# Patient Record
Sex: FEMALE | Race: WHITE | NOT HISPANIC OR LATINO | Employment: OTHER | URBAN - METROPOLITAN AREA
[De-identification: names, ages, dates, MRNs, and addresses within clinical notes are randomized per-mention and may not be internally consistent; named-entity substitution may affect disease eponyms.]

---

## 2022-04-21 ENCOUNTER — PRE-ADMISSION TESTING (OUTPATIENT)
Dept: PREADMISSION TESTING | Facility: HOSPITAL | Age: 72
End: 2022-04-21

## 2022-04-21 ENCOUNTER — HOSPITAL ENCOUNTER (OUTPATIENT)
Dept: GENERAL RADIOLOGY | Facility: HOSPITAL | Age: 72
Discharge: HOME | End: 2022-04-21

## 2022-04-21 VITALS
TEMPERATURE: 99.2 F | HEIGHT: 69 IN | OXYGEN SATURATION: 98 % | RESPIRATION RATE: 18 BRPM | WEIGHT: 262 LBS | HEART RATE: 68 BPM | BODY MASS INDEX: 38.8 KG/M2 | DIASTOLIC BLOOD PRESSURE: 71 MMHG | SYSTOLIC BLOOD PRESSURE: 131 MMHG

## 2022-04-21 LAB
ALBUMIN SERPL-MCNC: 4.5 G/DL (ref 3.5–5.2)
ALBUMIN/GLOB SERPL: 1.7 G/DL
ALP SERPL-CCNC: 223 U/L (ref 39–117)
ALT SERPL W P-5'-P-CCNC: 37 U/L (ref 1–33)
ANION GAP SERPL CALCULATED.3IONS-SCNC: 13 MMOL/L (ref 5–15)
AST SERPL-CCNC: 32 U/L (ref 1–32)
BILIRUB SERPL-MCNC: 0.7 MG/DL (ref 0–1.2)
BUN SERPL-MCNC: 16 MG/DL (ref 8–23)
BUN/CREAT SERPL: 13.3 (ref 7–25)
CALCIUM SPEC-SCNC: 9.7 MG/DL (ref 8.6–10.5)
CHLORIDE SERPL-SCNC: 106 MMOL/L (ref 98–107)
CO2 SERPL-SCNC: 25 MMOL/L (ref 22–29)
CREAT SERPL-MCNC: 1.2 MG/DL (ref 0.57–1)
DEPRECATED RDW RBC AUTO: 43.2 FL (ref 37–54)
EGFRCR SERPLBLD CKD-EPI 2021: 47.9 ML/MIN/1.73
ERYTHROCYTE [DISTWIDTH] IN BLOOD BY AUTOMATED COUNT: 13.8 % (ref 12.3–15.4)
GLOBULIN UR ELPH-MCNC: 2.6 GM/DL
GLUCOSE SERPL-MCNC: 119 MG/DL (ref 65–99)
HBA1C MFR BLD: 7.1 % (ref 4.8–5.6)
HCT VFR BLD AUTO: 34.1 % (ref 34–46.6)
HGB BLD-MCNC: 11.3 G/DL (ref 12–15.9)
INR PPP: 1 (ref 0.9–1.1)
MCH RBC QN AUTO: 28.5 PG (ref 26.6–33)
MCHC RBC AUTO-ENTMCNC: 33.1 G/DL (ref 31.5–35.7)
MCV RBC AUTO: 86.1 FL (ref 79–97)
PLATELET # BLD AUTO: 289 10*3/MM3 (ref 140–450)
PMV BLD AUTO: 10.9 FL (ref 6–12)
POTASSIUM SERPL-SCNC: 4.4 MMOL/L (ref 3.5–5.2)
PROT SERPL-MCNC: 7.1 G/DL (ref 6–8.5)
PROTHROMBIN TIME: 13.1 SECONDS (ref 11.7–14.2)
QT INTERVAL: 418 MS
RBC # BLD AUTO: 3.96 10*6/MM3 (ref 3.77–5.28)
SODIUM SERPL-SCNC: 144 MMOL/L (ref 136–145)
WBC NRBC COR # BLD: 8.91 10*3/MM3 (ref 3.4–10.8)

## 2022-04-21 PROCEDURE — 85027 COMPLETE CBC AUTOMATED: CPT

## 2022-04-21 PROCEDURE — 73030 X-RAY EXAM OF SHOULDER: CPT

## 2022-04-21 PROCEDURE — 93010 ELECTROCARDIOGRAM REPORT: CPT | Performed by: INTERNAL MEDICINE

## 2022-04-21 PROCEDURE — 36415 COLL VENOUS BLD VENIPUNCTURE: CPT

## 2022-04-21 PROCEDURE — 80053 COMPREHEN METABOLIC PANEL: CPT

## 2022-04-21 PROCEDURE — 85610 PROTHROMBIN TIME: CPT

## 2022-04-21 PROCEDURE — 83036 HEMOGLOBIN GLYCOSYLATED A1C: CPT

## 2022-04-21 PROCEDURE — 93005 ELECTROCARDIOGRAM TRACING: CPT

## 2022-04-21 RX ORDER — METFORMIN HYDROCHLORIDE 750 MG/1
750 TABLET, EXTENDED RELEASE ORAL 2 TIMES DAILY
COMMUNITY

## 2022-04-21 RX ORDER — METOPROLOL SUCCINATE 50 MG/1
50 TABLET, EXTENDED RELEASE ORAL DAILY
COMMUNITY

## 2022-04-21 RX ORDER — LANOLIN ALCOHOL/MO/W.PET/CERES
50 CREAM (GRAM) TOPICAL DAILY
COMMUNITY

## 2022-04-21 RX ORDER — MOMETASONE FUROATE 50 UG/1
1 SPRAY, METERED NASAL 2 TIMES DAILY
COMMUNITY

## 2022-04-21 RX ORDER — GUAIFENESIN 600 MG/1
1200 TABLET, EXTENDED RELEASE ORAL 2 TIMES DAILY PRN
COMMUNITY

## 2022-04-21 RX ORDER — ATORVASTATIN CALCIUM 20 MG/1
20 TABLET, FILM COATED ORAL DAILY
COMMUNITY

## 2022-04-21 RX ORDER — MENTHOL 40 MG/ML
1 GEL TOPICAL AS NEEDED
COMMUNITY

## 2022-04-21 RX ORDER — CALCIUM CARBONATE 200(500)MG
1 TABLET,CHEWABLE ORAL EVERY 4 HOURS PRN
COMMUNITY

## 2022-04-21 RX ORDER — CHOLECALCIFEROL (VITAMIN D3) 125 MCG
5 CAPSULE ORAL NIGHTLY
COMMUNITY

## 2022-04-21 RX ORDER — FUROSEMIDE 40 MG/1
40 TABLET ORAL DAILY
COMMUNITY

## 2022-04-21 RX ORDER — TRAMADOL HYDROCHLORIDE 50 MG/1
50 TABLET ORAL 2 TIMES DAILY PRN
COMMUNITY

## 2022-04-21 RX ORDER — MECLIZINE HYDROCHLORIDE 25 MG/1
25 TABLET ORAL 3 TIMES DAILY PRN
COMMUNITY

## 2022-04-21 RX ORDER — DIAPER,BRIEF,INFANT-TODD,DISP
1 EACH MISCELLANEOUS EVERY 6 HOURS PRN
COMMUNITY

## 2022-04-21 RX ORDER — PREGABALIN 150 MG/1
150 CAPSULE ORAL 2 TIMES DAILY
COMMUNITY

## 2022-04-21 RX ORDER — VENLAFAXINE HYDROCHLORIDE 75 MG/1
75 CAPSULE, EXTENDED RELEASE ORAL DAILY
COMMUNITY

## 2022-04-21 RX ORDER — ACETAMINOPHEN 325 MG/1
650 TABLET ORAL EVERY 6 HOURS PRN
COMMUNITY

## 2022-04-21 RX ORDER — LEVOTHYROXINE SODIUM 0.1 MG/1
100 TABLET ORAL DAILY
COMMUNITY

## 2022-04-21 RX ORDER — ERGOCALCIFEROL 1.25 MG/1
50000 CAPSULE ORAL WEEKLY
COMMUNITY

## 2022-04-21 RX ORDER — NAPROXEN SODIUM 220 MG
220 TABLET ORAL DAILY PRN
COMMUNITY

## 2022-04-21 RX ORDER — MONTELUKAST SODIUM 10 MG/1
10 TABLET ORAL DAILY
COMMUNITY

## 2022-04-21 RX ORDER — ASPIRIN 325 MG
325 TABLET ORAL DAILY
COMMUNITY

## 2022-04-21 NOTE — DISCHARGE INSTRUCTIONS
Take the following medications the morning of surgery:  VENLAFAXINE, NEXIUM, LEVOTHYROXINE, LYRICA, METOPROLOL    Arrive to hospital on your day of surgery at  7:00 AM.      If you are on prescription narcotic pain medication to control your pain you may also take that medication the morning of surgery.    General Instructions:  Do not eat solid food after midnight the night before surgery.  You may drink clear liquids day of surgery but must stop at least one hour before your hospital arrival time.  It is beneficial for you to have a clear drink that contains carbohydrates the day of surgery.  We suggest a 12 to 20 ounce bottle of Gatorade or Powerade for non-diabetic patients or a 12 to 20 ounce bottle of G2 or Powerade Zero for diabetic patients. (Pediatric patients, are not advised to drink a 12 to 20 ounce carbohydrate drink)    Clear liquids are liquids you can see through.  Nothing red in color.     Plain water                               Sports drinks  Sodas                                   Gelatin (Jell-O)  Fruit juices without pulp such as white grape juice and apple juice  Popsicles that contain no fruit or yogurt  Tea or coffee (no cream or milk added)  Gatorade / Powerade  G2 / Powerade Zero    Infants may have breast milk up to four hours before surgery.  Infants drinking formula may drink formula up to six hours before surgery.   Patients who avoid smoking, chewing tobacco and alcohol for 4 weeks prior to surgery have a reduced risk of post-operative complications.  Quit smoking as many days before surgery as you can.  Do not smoke, use chewing tobacco or drink alcohol the day of surgery.   If applicable bring your C-PAP/ BI-PAP machine.  Bring any papers given to you in the doctor’s office.  Wear clean comfortable clothes.  Do not wear contact lenses, false eyelashes or make-up.  Bring a case for your glasses.   Bring crutches or walker if applicable.  Remove all piercings.  Leave jewelry and any  other valuables at home.  Hair extensions with metal clips must be removed prior to surgery.  The Pre-Admission Testing nurse will instruct you to bring medications if unable to obtain an accurate list in Pre-Admission Testing.        If you were given a blood bank ID arm band remember to bring it with you the day of surgery.    Day of surgery:  Your arrival time is approximately two hours before your scheduled surgery time.  Upon arrival, a Pre-op nurse and Anesthesiologist will review your health history, obtain vital signs, and answer questions you may have.  The only belongings needed at this time will be a list of your home medications and if applicable your C-PAP/BI-PAP machine.  A Pre-op nurse will start an IV and you may receive medication in preparation for surgery, including something to help you relax.     Please be aware that surgery does come with discomfort.  We want to make every effort to control your discomfort so please discuss any uncontrolled symptoms with your nurse.   Your doctor will most likely have prescribed pain medications.      If you are going home after surgery you will receive individualized written care instructions before being discharged.  A responsible adult must drive you to and from the hospital on the day of your surgery and stay with you for 24 hours.  Discharge prescriptions can be filled by the hospital pharmacy during regular pharmacy hours.  If you are having surgery late in the day/evening your prescription may be e-prescribed to your pharmacy.  Please verify your pharmacy hours or chose a 24 hour pharmacy to avoid not having access to your prescription because your pharmacy has closed for the day.    If you are staying overnight following surgery, you will be transported to your hospital room following the recovery period.  [facility] has all private rooms.    If you have any questions please call Pre-Admission Testing at [phone].  Deductibles and  co-payments are collected on the day of service. Please be prepared to pay the required co-pay, deductible or deposit on the day of service as defined by your plan.    Patient Education for Self-Quarantine Process    Following your COVID testing, we strongly recommend that you wear a mask when you are with other people and practice social distancing.   Limit your activities to only required outings.  Wash your hands with soap and water frequently for at least 20 seconds.   Avoid touching your eyes, nose and mouth with unwashed hands.  Do not share anything - utensils, drinking glasses, food from the same bowl.   Sanitize household surfaces daily. Include all high touch areas (door handles, light switches, phones, countertops, etc.)    Call your surgeon immediately if you experience any of the following symptoms:  Sore Throat  Shortness of Breath or difficulty breathing  Cough  Chills  Body soreness or muscle pain  Headache  Fever  New loss of taste or smell  Do not arrive for your surgery ill.  Your procedure will need to be rescheduled to another time.  You will need to call your physician before the day of surgery to avoid any unnecessary exposure to hospital staff as well as other patients.        PREVENTING INFECTION IN SHOULDER SURGICAL SITES     C. acnes is a bacteria that lives deep within follicles and pores of the skin. It is found in large numbers on the skin of the face, axilla (armpit), chest and back and is the primary bacteria to cause a surgical site infection after shoulder surgery.      Use of a Benzoyl Peroxide solution prior to shoulder surgery decreases C. acnes and reduces post-op infections.   Your surgeon has ordered 5% Benzoyl Peroxide wash to be used three times prior to your surgery.     Please read the following instructions carefully and bring this form with you the day of surgery.     General bathing instructions starting two days before your surgery:    Shower using a fresh bar of  anti-bacterial soap (such as Dial) and clean washcloth.  Pay special attention to the neck, shoulder and armpit area.   Wash your hair as usual with your regular shampoo.   Rinse hair and body thoroughly with warm water (not hot water) to remove shampoo and residue.   Dry with a clean towel.              Sleep in a clean bed with clean clothing.  Do not allow pets to sleep with you.     For 2 days before surgery, avoid shaving with a razor because the razor can irritate skin and make it easier to develop an infection.    Any areas of open skin can increase the risk of a post-operative wound infection by allowing bacteria to enter and travel throughout the body.  Notify your surgeon if you have any skin wounds / rashes even if it is not near the expected surgical site.  The area will need assessed to determine if surgery should be delayed until it is healed.      First application of 5% Benzoyl Peroxide Wash two nights before surgery:                                                                Wash neck, shoulder (front, back, side) and armpit   with warm water, rinse and dry - see picture.  Gently wash the same areas with the Benzoyl Peroxide   cleanser going away from the neck for 10-20 seconds.   Work into a full lather and leave on the skin for   2 minutes for greatest effect.  Rinse thoroughly with warm water, not hot water.                     Pat dry with a clean towel.                                                            Wash your hands thoroughly.  Do not apply lotion, powder, perfume or deodorant.   Put on clean clothes.    Second application the night before surgery:  Repeat the above steps.        Third application morning of surgery:  Repeat the above steps.    Due to shoulder pain or decreased range of motion of your shoulder and arm, you may need assistance washing under the arm or the back portion of your shoulder.     Avoid further washing the areas of the skin treated with Benzoyl Peroxide  for at least 1 hour.    For your convenience, you may purchase Benzoyl Peroxide at [facility] retail pharmacy.     Warning:  Let your physician know if you are allergic to Benzoyl Peroxide or have very sensitive skin and cannot use it.   Stop using and contact your surgeon if you experience any excessive scaling, itching, swelling, skin irritation or other signs of a reaction.  Keep out of eyes, ears, nose and mouth.  Do not apply to sunburned, irritated or broken area on the skin.  Avoid unwanted problems with bleaching effect by following these tips:  Wash hands after each use.  Avoid contact with hair, clothing, furnishings or carpeting.  Wear clean, old t-shirt or clothing to bed.   Use clean, old white pillow cases and sheets to avoid discoloring your bed linens.                                                                                                                                                                                                                                                                                   Please complete the checklist below, bring it with you to the hospital                               the day of surgery and give to the Pre-op Nurse     Preoperative Skin Prep Checklist        Patient Name Label             Enter dates and ?  boxes to indicate completed    Surgery Date: _______________ Regular Shower  Benzoyl Peroxide Wash   First Application:  2 days before_______________  (Stop shaving all body parts)           Morning or Evening                        Evening    Second Application:  1 day  before________________        Morning or Evening                          Evening   Third Application:  Day of Surgery______________                           Morning                   Morning

## 2022-05-10 ENCOUNTER — LAB (OUTPATIENT)
Dept: LAB | Facility: HOSPITAL | Age: 72
End: 2022-05-10

## 2022-05-10 LAB — SARS-COV-2 ORF1AB RESP QL NAA+PROBE: NOT DETECTED

## 2022-05-10 PROCEDURE — U0004 COV-19 TEST NON-CDC HGH THRU: HCPCS

## 2022-05-10 PROCEDURE — C9803 HOPD COVID-19 SPEC COLLECT: HCPCS

## 2022-05-23 ENCOUNTER — PRE-ADMISSION TESTING (OUTPATIENT)
Dept: PREADMISSION TESTING | Facility: HOSPITAL | Age: 72
End: 2022-05-23

## 2022-05-23 VITALS
TEMPERATURE: 97.6 F | HEIGHT: 70 IN | BODY MASS INDEX: 37.22 KG/M2 | OXYGEN SATURATION: 97 % | DIASTOLIC BLOOD PRESSURE: 83 MMHG | RESPIRATION RATE: 16 BRPM | WEIGHT: 260 LBS | SYSTOLIC BLOOD PRESSURE: 137 MMHG | HEART RATE: 72 BPM

## 2022-05-23 LAB
ALBUMIN SERPL-MCNC: 4.4 G/DL (ref 3.5–5.2)
ALBUMIN/GLOB SERPL: 1.9 G/DL
ALP SERPL-CCNC: 214 U/L (ref 39–117)
ALT SERPL W P-5'-P-CCNC: 35 U/L (ref 1–33)
ANION GAP SERPL CALCULATED.3IONS-SCNC: 13.2 MMOL/L (ref 5–15)
AST SERPL-CCNC: 29 U/L (ref 1–32)
BILIRUB SERPL-MCNC: 0.8 MG/DL (ref 0–1.2)
BUN SERPL-MCNC: 20 MG/DL (ref 8–23)
BUN/CREAT SERPL: 18.2 (ref 7–25)
CALCIUM SPEC-SCNC: 9.5 MG/DL (ref 8.6–10.5)
CHLORIDE SERPL-SCNC: 102 MMOL/L (ref 98–107)
CO2 SERPL-SCNC: 22.8 MMOL/L (ref 22–29)
CREAT SERPL-MCNC: 1.1 MG/DL (ref 0.57–1)
DEPRECATED RDW RBC AUTO: 40.6 FL (ref 37–54)
EGFRCR SERPLBLD CKD-EPI 2021: 53.2 ML/MIN/1.73
ERYTHROCYTE [DISTWIDTH] IN BLOOD BY AUTOMATED COUNT: 12.7 % (ref 12.3–15.4)
GLOBULIN UR ELPH-MCNC: 2.3 GM/DL
GLUCOSE SERPL-MCNC: 123 MG/DL (ref 65–99)
HBA1C MFR BLD: 6.8 % (ref 4.8–5.6)
HCT VFR BLD AUTO: 34.6 % (ref 34–46.6)
HGB BLD-MCNC: 11 G/DL (ref 12–15.9)
INR PPP: 1.05 (ref 0.9–1.1)
MCH RBC QN AUTO: 27.8 PG (ref 26.6–33)
MCHC RBC AUTO-ENTMCNC: 31.8 G/DL (ref 31.5–35.7)
MCV RBC AUTO: 87.4 FL (ref 79–97)
PLATELET # BLD AUTO: 299 10*3/MM3 (ref 140–450)
PMV BLD AUTO: 10.7 FL (ref 6–12)
POTASSIUM SERPL-SCNC: 3.8 MMOL/L (ref 3.5–5.2)
PROT SERPL-MCNC: 6.7 G/DL (ref 6–8.5)
PROTHROMBIN TIME: 13.6 SECONDS (ref 11.7–14.2)
RBC # BLD AUTO: 3.96 10*6/MM3 (ref 3.77–5.28)
SARS-COV-2 ORF1AB RESP QL NAA+PROBE: NOT DETECTED
SODIUM SERPL-SCNC: 138 MMOL/L (ref 136–145)
WBC NRBC COR # BLD: 8.18 10*3/MM3 (ref 3.4–10.8)

## 2022-05-23 PROCEDURE — C9803 HOPD COVID-19 SPEC COLLECT: HCPCS

## 2022-05-23 PROCEDURE — 85610 PROTHROMBIN TIME: CPT

## 2022-05-23 PROCEDURE — 85027 COMPLETE CBC AUTOMATED: CPT

## 2022-05-23 PROCEDURE — U0004 COV-19 TEST NON-CDC HGH THRU: HCPCS

## 2022-05-23 PROCEDURE — 80053 COMPREHEN METABOLIC PANEL: CPT

## 2022-05-23 PROCEDURE — 83036 HEMOGLOBIN GLYCOSYLATED A1C: CPT

## 2022-05-23 PROCEDURE — 36415 COLL VENOUS BLD VENIPUNCTURE: CPT

## 2022-05-23 NOTE — DISCHARGE INSTRUCTIONS
Take the following medications the morning of surgery: ESOMEPRAZOLE, LEVOTHYROXINE, METOPROLOL, PREGABALIN,VENLAFAXINE    ARRIVE AT 10:30 AM.      If you are on prescription narcotic pain medication to control your pain you may also take that medication the morning of surgery.    General Instructions:  Do not eat solid food after midnight the night before surgery.  You may drink clear liquids day of surgery but must stop at least one hour before your hospital arrival time.  It is beneficial for you to have a clear drink that contains carbohydrates the day of surgery.  We suggest a 12 to 20 ounce bottle of Gatorade or Powerade for non-diabetic patients or a 12 to 20 ounce bottle of G2 or Powerade Zero for diabetic patients. (Pediatric patients, are not advised to drink a 12 to 20 ounce carbohydrate drink)    Clear liquids are liquids you can see through.  Nothing red in color.     Plain water                               Sports drinks  Sodas                                   Gelatin (Jell-O)  Fruit juices without pulp such as white grape juice and apple juice  Popsicles that contain no fruit or yogurt  Tea or coffee (no cream or milk added)  Gatorade / Powerade  G2 / Powerade Zero  Patients who avoid smoking, chewing tobacco and alcohol for 4 weeks prior to surgery have a reduced risk of post-operative complications.  Quit smoking as many days before surgery as you can.  Do not smoke, use chewing tobacco or drink alcohol the day of surgery.   If applicable bring your C-PAP/ BI-PAP machine.  Bring any papers given to you in the doctor’s office.  Wear clean comfortable clothes.  Do not wear contact lenses, false eyelashes or make-up.  Bring a case for your glasses.   Bring crutches or walker if applicable.  Remove all piercings.  Leave jewelry and any other valuables at home.  Hair extensions with metal clips must be removed prior to surgery.  The Pre-Admission Testing nurse will instruct you to bring medications if  unable to obtain an accurate list in Pre-Admission Testing.          Day of surgery:  Your arrival time is approximately two hours before your scheduled surgery time.  Upon arrival, a Pre-op nurse and Anesthesiologist will review your health history, obtain vital signs, and answer questions you may have.  The only belongings needed at this time will be a list of your home medications and if applicable your C-PAP/BI-PAP machine.  A Pre-op nurse will start an IV and you may receive medication in preparation for surgery, including something to help you relax.     Please be aware that surgery does come with discomfort.  We want to make every effort to control your discomfort so please discuss any uncontrolled symptoms with your nurse.   Your doctor will most likely have prescribed pain medications.      If you are going home after surgery you will receive individualized written care instructions before being discharged.  A responsible adult must drive you to and from the hospital on the day of your surgery and stay with you for 24 hours.  Discharge prescriptions can be filled by the hospital pharmacy during regular pharmacy hours.  If you are having surgery late in the day/evening your prescription may be e-prescribed to your pharmacy.  Please verify your pharmacy hours or chose a 24 hour pharmacy to avoid not having access to your prescription because your pharmacy has closed for the day.    If you are staying overnight following surgery, you will be transported to your hospital room following the recovery period.  [facility] has all private rooms.    If you have any questions please call Pre-Admission Testing at [phone].  Deductibles and co-payments are collected on the day of service. Please be prepared to pay the required co-pay, deductible or deposit on the day of service as defined by your plan.    Patient Education for Self-Quarantine Process    Following your COVID testing, we strongly  recommend that you wear a mask when you are with other people and practice social distancing.   Limit your activities to only required outings.  Wash your hands with soap and water frequently for at least 20 seconds.   Avoid touching your eyes, nose and mouth with unwashed hands.  Do not share anything - utensils, drinking glasses, food from the same bowl.   Sanitize household surfaces daily. Include all high touch areas (door handles, light switches, phones, countertops, etc.)    Call your surgeon immediately if you experience any of the following symptoms:  Sore Throat  Shortness of Breath or difficulty breathing  Cough  Chills  Body soreness or muscle pain  Headache  Fever  New loss of taste or smell  Do not arrive for your surgery ill.  Your procedure will need to be rescheduled to another time.  You will need to call your physician before the day of surgery to avoid any unnecessary exposure to hospital staff as well as other patients.        PREVENTING INFECTION IN SHOULDER SURGICAL SITES     C. acnes is a bacteria that lives deep within follicles and pores of the skin. It is found in large numbers on the skin of the face, axilla (armpit), chest and back and is the primary bacteria to cause a surgical site infection after shoulder surgery.      Use of a Benzoyl Peroxide solution prior to shoulder surgery decreases C. acnes and reduces post-op infections.   Your surgeon has ordered 5% Benzoyl Peroxide wash to be used three times prior to your surgery.     Please read the following instructions carefully and bring this form with you the day of surgery.     General bathing instructions starting two days before your surgery:    Shower using a fresh bar of anti-bacterial soap (such as Dial) and clean washcloth.  Pay special attention to the neck, shoulder and armpit area.   Wash your hair as usual with your regular shampoo.   Rinse hair and body thoroughly with warm water (not hot water) to remove shampoo and  residue.   Dry with a clean towel.              Sleep in a clean bed with clean clothing.  Do not allow pets to sleep with you.     For 2 days before surgery, avoid shaving with a razor because the razor can irritate skin and make it easier to develop an infection.    Any areas of open skin can increase the risk of a post-operative wound infection by allowing bacteria to enter and travel throughout the body.  Notify your surgeon if you have any skin wounds / rashes even if it is not near the expected surgical site.  The area will need assessed to determine if surgery should be delayed until it is healed.      First application of 5% Benzoyl Peroxide Wash two nights before surgery:                                                                Wash neck, shoulder (front, back, side) and armpit   with warm water, rinse and dry - see picture.  Gently wash the same areas with the Benzoyl Peroxide   cleanser going away from the neck for 10-20 seconds.   Work into a full lather and leave on the skin for   2 minutes for greatest effect.  Rinse thoroughly with warm water, not hot water.                     Pat dry with a clean towel.                                                            Wash your hands thoroughly.  Do not apply lotion, powder, perfume or deodorant.   Put on clean clothes.    Second application the night before surgery:  Repeat the above steps.        Third application morning of surgery:  Repeat the above steps.    Due to shoulder pain or decreased range of motion of your shoulder and arm, you may need assistance washing under the arm or the back portion of your shoulder.     Avoid further washing the areas of the skin treated with Benzoyl Peroxide for at least 1 hour.    For your convenience, you may purchase Benzoyl Peroxide at [facility] retail pharmacy.     Warning:  Let your physician know if you are allergic to Benzoyl Peroxide or have very sensitive skin and cannot use it.   Stop using and  contact your surgeon if you experience any excessive scaling, itching, swelling, skin irritation or other signs of a reaction.  Keep out of eyes, ears, nose and mouth.  Do not apply to sunburned, irritated or broken area on the skin.  Avoid unwanted problems with bleaching effect by following these tips:  Wash hands after each use.  Avoid contact with hair, clothing, furnishings or carpeting.  Wear clean, old t-shirt or clothing to bed.   Use clean, old white pillow cases and sheets to avoid discoloring your bed linens.                                                                                                                                                                                                                                                                                   Please complete the checklist below, bring it with you to the hospital                               the day of surgery and give to the Pre-op Nurse     Preoperative Skin Prep Checklist        Patient Name Label             Enter dates and ?  boxes to indicate completed    Surgery Date: _______________ Regular Shower  Benzoyl Peroxide Wash   First Application:  2 days before_______________  (Stop shaving all body parts)           Morning or Evening                        Evening    Second Application:  1 day  before________________        Morning or Evening                          Evening   Third Application:  Day of Surgery______________                           Morning                   Morning

## 2022-05-25 ENCOUNTER — ANESTHESIA EVENT (OUTPATIENT)
Dept: PERIOP | Facility: HOSPITAL | Age: 72
End: 2022-05-25

## 2022-05-25 ENCOUNTER — HOSPITAL ENCOUNTER (OUTPATIENT)
Facility: HOSPITAL | Age: 72
Discharge: NURSING, REHAB OR LONG-TERM CARE | End: 2022-05-26
Attending: ORTHOPAEDIC SURGERY | Admitting: ORTHOPAEDIC SURGERY

## 2022-05-25 ENCOUNTER — APPOINTMENT (OUTPATIENT)
Dept: GENERAL RADIOLOGY | Facility: HOSPITAL | Age: 72
End: 2022-05-25

## 2022-05-25 ENCOUNTER — ANESTHESIA (OUTPATIENT)
Dept: PERIOP | Facility: HOSPITAL | Age: 72
End: 2022-05-25

## 2022-05-25 DIAGNOSIS — Z96.611 H/O TOTAL SHOULDER REPLACEMENT, RIGHT: Primary | ICD-10-CM

## 2022-05-25 PROBLEM — J44.9 COPD (CHRONIC OBSTRUCTIVE PULMONARY DISEASE): Status: ACTIVE | Noted: 2022-05-25

## 2022-05-25 PROBLEM — I10 HTN (HYPERTENSION): Status: ACTIVE | Noted: 2022-05-25

## 2022-05-25 PROBLEM — E11.9 TYPE 2 DIABETES MELLITUS, WITHOUT LONG-TERM CURRENT USE OF INSULIN: Status: ACTIVE | Noted: 2022-05-25

## 2022-05-25 PROBLEM — K21.9 GERD WITHOUT ESOPHAGITIS: Status: ACTIVE | Noted: 2022-05-25

## 2022-05-25 PROBLEM — E03.9 HYPOTHYROIDISM: Status: ACTIVE | Noted: 2022-05-25

## 2022-05-25 LAB
GLUCOSE BLDC GLUCOMTR-MCNC: 131 MG/DL (ref 70–130)
GLUCOSE BLDC GLUCOMTR-MCNC: 153 MG/DL (ref 70–130)
GLUCOSE BLDC GLUCOMTR-MCNC: 212 MG/DL (ref 70–130)
HCT VFR BLD AUTO: 31.1 % (ref 34–46.6)
HGB BLD-MCNC: 10.2 G/DL (ref 12–15.9)

## 2022-05-25 PROCEDURE — G0378 HOSPITAL OBSERVATION PER HR: HCPCS

## 2022-05-25 PROCEDURE — 25010000002 KETOROLAC TROMETHAMINE PER 15 MG: Performed by: ORTHOPAEDIC SURGERY

## 2022-05-25 PROCEDURE — A9270 NON-COVERED ITEM OR SERVICE: HCPCS | Performed by: ORTHOPAEDIC SURGERY

## 2022-05-25 PROCEDURE — 25010000002 ROPIVACAINE PER 1 MG: Performed by: ORTHOPAEDIC SURGERY

## 2022-05-25 PROCEDURE — C1776 JOINT DEVICE (IMPLANTABLE): HCPCS | Performed by: ORTHOPAEDIC SURGERY

## 2022-05-25 PROCEDURE — L3670 SO ACRO/CLAV CAN WEB PRE OTS: HCPCS | Performed by: ORTHOPAEDIC SURGERY

## 2022-05-25 PROCEDURE — 25010000002 ONDANSETRON PER 1 MG: Performed by: NURSE ANESTHETIST, CERTIFIED REGISTERED

## 2022-05-25 PROCEDURE — 25010000002 DEXAMETHASONE PER 1 MG: Performed by: NURSE ANESTHETIST, CERTIFIED REGISTERED

## 2022-05-25 PROCEDURE — 0 BUPIVACAINE LIPOSOME 1.3 % SUSPENSION: Performed by: ANESTHESIOLOGY

## 2022-05-25 PROCEDURE — 25010000002 PHENYLEPHRINE 10 MG/ML SOLUTION 5 ML VIAL: Performed by: NURSE ANESTHETIST, CERTIFIED REGISTERED

## 2022-05-25 PROCEDURE — 25010000002 FENTANYL CITRATE (PF) 50 MCG/ML SOLUTION: Performed by: ANESTHESIOLOGY

## 2022-05-25 PROCEDURE — 76942 ECHO GUIDE FOR BIOPSY: CPT | Performed by: ORTHOPAEDIC SURGERY

## 2022-05-25 PROCEDURE — 25010000002 CEFAZOLIN PER 500 MG: Performed by: ORTHOPAEDIC SURGERY

## 2022-05-25 PROCEDURE — 25010000002 PHENYLEPHRINE 10 MG/ML SOLUTION: Performed by: NURSE ANESTHETIST, CERTIFIED REGISTERED

## 2022-05-25 PROCEDURE — 63710000001 VENLAFAXINE XR 75 MG CAPSULE SUSTAINED-RELEASE 24 HR: Performed by: ORTHOPAEDIC SURGERY

## 2022-05-25 PROCEDURE — 85014 HEMATOCRIT: CPT | Performed by: ORTHOPAEDIC SURGERY

## 2022-05-25 PROCEDURE — 25010000002 CLONIDINE PER 1 MG: Performed by: ORTHOPAEDIC SURGERY

## 2022-05-25 PROCEDURE — C9290 INJ, BUPIVACAINE LIPOSOME: HCPCS | Performed by: ANESTHESIOLOGY

## 2022-05-25 PROCEDURE — 25010000002 NEOSTIGMINE 5 MG/10ML SOLUTION: Performed by: NURSE ANESTHETIST, CERTIFIED REGISTERED

## 2022-05-25 PROCEDURE — 73030 X-RAY EXAM OF SHOULDER: CPT

## 2022-05-25 PROCEDURE — 25010000002 EPINEPHRINE 1 MG/ML SOLUTION 30 ML VIAL: Performed by: ORTHOPAEDIC SURGERY

## 2022-05-25 PROCEDURE — 63710000001 ATORVASTATIN 20 MG TABLET: Performed by: ORTHOPAEDIC SURGERY

## 2022-05-25 PROCEDURE — 85018 HEMOGLOBIN: CPT | Performed by: ORTHOPAEDIC SURGERY

## 2022-05-25 PROCEDURE — 63710000001 PREGABALIN 75 MG CAPSULE: Performed by: ORTHOPAEDIC SURGERY

## 2022-05-25 PROCEDURE — 25010000002 PROPOFOL 10 MG/ML EMULSION: Performed by: NURSE ANESTHETIST, CERTIFIED REGISTERED

## 2022-05-25 PROCEDURE — 25010000002 MIDAZOLAM PER 1 MG: Performed by: ANESTHESIOLOGY

## 2022-05-25 PROCEDURE — 82962 GLUCOSE BLOOD TEST: CPT

## 2022-05-25 DEVICE — IMPLANTABLE DEVICE: Type: IMPLANTABLE DEVICE | Site: SHOULDER | Status: FUNCTIONAL

## 2022-05-25 DEVICE — SHORT HUMERAL DIAPHYSIS - CEMENTLESS - 10
Type: IMPLANTABLE DEVICE | Site: SHOULDER | Status: FUNCTIONAL
Brand: SHOULDER SYSTEM

## 2022-05-25 DEVICE — DEV CONTRL TISS STRATAFIX SPIRAL MNCRYL UD 3/0 PLS 30CM: Type: IMPLANTABLE DEVICE | Site: SHOULDER | Status: FUNCTIONAL

## 2022-05-25 DEVICE — GLENOIDSPHERE 36XØ24.5
Type: IMPLANTABLE DEVICE | Site: SHOULDER | Status: FUNCTIONAL
Brand: SHOULDER SYSTEM

## 2022-05-25 DEVICE — FW,BPB #2 SUTR,BLU W/NDL
Type: IMPLANTABLE DEVICE | Site: SHOULDER | Status: FUNCTIONAL
Brand: ARTHREX®

## 2022-05-25 DEVICE — GLENOID POLYAXIAL LOCKING SCREW - L14
Type: IMPLANTABLE DEVICE | Site: SHOULDER | Status: FUNCTIONAL
Brand: SHOULDER SYSTEM

## 2022-05-25 DEVICE — HUMERAL REVERSE HC LINER Ø36/+6MM
Type: IMPLANTABLE DEVICE | Site: SHOULDER | Status: FUNCTIONAL
Brand: SHOULDER SYSTEM

## 2022-05-25 DEVICE — HUMERAL REVERSE METAPHYSIS +0MM/0°
Type: IMPLANTABLE DEVICE | Site: SHOULDER | Status: FUNCTIONAL
Brand: SHOULDER SYSTEM

## 2022-05-25 DEVICE — GLENOID POLYAXIAL LOCKING SCREW - L22
Type: IMPLANTABLE DEVICE | Site: SHOULDER | Status: FUNCTIONAL
Brand: SHOULDER SYSTEM

## 2022-05-25 DEVICE — THREADED GLENOID BASEPLATE Ø24.5X25
Type: IMPLANTABLE DEVICE | Site: SHOULDER | Status: FUNCTIONAL
Brand: SHOULDER SYSTEM

## 2022-05-25 DEVICE — SHOULDER DISPOSABLE PIN PACK
Type: IMPLANTABLE DEVICE | Site: SHOULDER | Status: FUNCTIONAL
Brand: SHOULDER INSTRUMENTS

## 2022-05-25 RX ORDER — DEXTROSE MONOHYDRATE 25 G/50ML
25 INJECTION, SOLUTION INTRAVENOUS
Status: DISCONTINUED | OUTPATIENT
Start: 2022-05-25 | End: 2022-05-26 | Stop reason: HOSPADM

## 2022-05-25 RX ORDER — TRAMADOL HYDROCHLORIDE 50 MG/1
50 TABLET ORAL 2 TIMES DAILY PRN
Status: DISCONTINUED | OUTPATIENT
Start: 2022-05-25 | End: 2022-05-26 | Stop reason: HOSPADM

## 2022-05-25 RX ORDER — ONDANSETRON 2 MG/ML
4 INJECTION INTRAMUSCULAR; INTRAVENOUS ONCE AS NEEDED
Status: DISCONTINUED | OUTPATIENT
Start: 2022-05-25 | End: 2022-05-25 | Stop reason: HOSPADM

## 2022-05-25 RX ORDER — ONDANSETRON 2 MG/ML
4 INJECTION INTRAMUSCULAR; INTRAVENOUS EVERY 6 HOURS PRN
Status: DISCONTINUED | OUTPATIENT
Start: 2022-05-25 | End: 2022-05-26 | Stop reason: HOSPADM

## 2022-05-25 RX ORDER — INSULIN LISPRO 100 [IU]/ML
0-9 INJECTION, SOLUTION INTRAVENOUS; SUBCUTANEOUS
Status: DISCONTINUED | OUTPATIENT
Start: 2022-05-26 | End: 2022-05-26 | Stop reason: HOSPADM

## 2022-05-25 RX ORDER — SODIUM CHLORIDE, SODIUM LACTATE, POTASSIUM CHLORIDE, CALCIUM CHLORIDE 600; 310; 30; 20 MG/100ML; MG/100ML; MG/100ML; MG/100ML
100 INJECTION, SOLUTION INTRAVENOUS CONTINUOUS
Status: DISCONTINUED | OUTPATIENT
Start: 2022-05-25 | End: 2022-05-26 | Stop reason: HOSPADM

## 2022-05-25 RX ORDER — NEOSTIGMINE METHYLSULFATE 0.5 MG/ML
INJECTION, SOLUTION INTRAVENOUS AS NEEDED
Status: DISCONTINUED | OUTPATIENT
Start: 2022-05-25 | End: 2022-05-25 | Stop reason: SURG

## 2022-05-25 RX ORDER — FLUMAZENIL 0.1 MG/ML
0.2 INJECTION INTRAVENOUS AS NEEDED
Status: DISCONTINUED | OUTPATIENT
Start: 2022-05-25 | End: 2022-05-25 | Stop reason: HOSPADM

## 2022-05-25 RX ORDER — PROPOFOL 10 MG/ML
VIAL (ML) INTRAVENOUS AS NEEDED
Status: DISCONTINUED | OUTPATIENT
Start: 2022-05-25 | End: 2022-05-25 | Stop reason: SURG

## 2022-05-25 RX ORDER — METOPROLOL SUCCINATE 50 MG/1
50 TABLET, EXTENDED RELEASE ORAL DAILY
Status: DISCONTINUED | OUTPATIENT
Start: 2022-05-26 | End: 2022-05-26 | Stop reason: HOSPADM

## 2022-05-25 RX ORDER — HYDROCODONE BITARTRATE AND ACETAMINOPHEN 7.5; 325 MG/1; MG/1
2 TABLET ORAL EVERY 4 HOURS PRN
Status: DISCONTINUED | OUTPATIENT
Start: 2022-05-25 | End: 2022-05-26 | Stop reason: HOSPADM

## 2022-05-25 RX ORDER — HYDROMORPHONE HYDROCHLORIDE 1 MG/ML
0.5 INJECTION, SOLUTION INTRAMUSCULAR; INTRAVENOUS; SUBCUTANEOUS
Status: DISCONTINUED | OUTPATIENT
Start: 2022-05-25 | End: 2022-05-26 | Stop reason: HOSPADM

## 2022-05-25 RX ORDER — ONDANSETRON 2 MG/ML
INJECTION INTRAMUSCULAR; INTRAVENOUS AS NEEDED
Status: DISCONTINUED | OUTPATIENT
Start: 2022-05-25 | End: 2022-05-25 | Stop reason: SURG

## 2022-05-25 RX ORDER — VENLAFAXINE HYDROCHLORIDE 75 MG/1
75 CAPSULE, EXTENDED RELEASE ORAL DAILY
Status: DISCONTINUED | OUTPATIENT
Start: 2022-05-25 | End: 2022-05-26 | Stop reason: HOSPADM

## 2022-05-25 RX ORDER — ACETAMINOPHEN 325 MG/1
325 TABLET ORAL EVERY 4 HOURS PRN
Status: DISCONTINUED | OUTPATIENT
Start: 2022-05-25 | End: 2022-05-26 | Stop reason: HOSPADM

## 2022-05-25 RX ORDER — ACETAMINOPHEN 325 MG/1
650 TABLET ORAL EVERY 6 HOURS PRN
Status: DISCONTINUED | OUTPATIENT
Start: 2022-05-25 | End: 2022-05-26 | Stop reason: HOSPADM

## 2022-05-25 RX ORDER — LABETALOL HYDROCHLORIDE 5 MG/ML
5 INJECTION, SOLUTION INTRAVENOUS
Status: DISCONTINUED | OUTPATIENT
Start: 2022-05-25 | End: 2022-05-25 | Stop reason: HOSPADM

## 2022-05-25 RX ORDER — FAMOTIDINE 10 MG/ML
20 INJECTION, SOLUTION INTRAVENOUS ONCE
Status: COMPLETED | OUTPATIENT
Start: 2022-05-25 | End: 2022-05-25

## 2022-05-25 RX ORDER — CEFAZOLIN SODIUM IN 0.9 % NACL 3 G/100 ML
3 INTRAVENOUS SOLUTION, PIGGYBACK (ML) INTRAVENOUS EVERY 8 HOURS
Status: COMPLETED | OUTPATIENT
Start: 2022-05-25 | End: 2022-05-26

## 2022-05-25 RX ORDER — PREGABALIN 75 MG/1
150 CAPSULE ORAL 2 TIMES DAILY
Status: DISCONTINUED | OUTPATIENT
Start: 2022-05-25 | End: 2022-05-26 | Stop reason: HOSPADM

## 2022-05-25 RX ORDER — FENTANYL CITRATE 50 UG/ML
50 INJECTION, SOLUTION INTRAMUSCULAR; INTRAVENOUS
Status: DISCONTINUED | OUTPATIENT
Start: 2022-05-25 | End: 2022-05-25 | Stop reason: HOSPADM

## 2022-05-25 RX ORDER — GLYCOPYRROLATE 0.2 MG/ML
INJECTION INTRAMUSCULAR; INTRAVENOUS AS NEEDED
Status: DISCONTINUED | OUTPATIENT
Start: 2022-05-25 | End: 2022-05-25 | Stop reason: SURG

## 2022-05-25 RX ORDER — ERGOCALCIFEROL 1.25 MG/1
50000 CAPSULE ORAL WEEKLY
Status: DISCONTINUED | OUTPATIENT
Start: 2022-05-27 | End: 2022-05-26 | Stop reason: HOSPADM

## 2022-05-25 RX ORDER — KETOROLAC TROMETHAMINE 15 MG/ML
15 INJECTION, SOLUTION INTRAMUSCULAR; INTRAVENOUS EVERY 6 HOURS
Status: DISCONTINUED | OUTPATIENT
Start: 2022-05-25 | End: 2022-05-26 | Stop reason: HOSPADM

## 2022-05-25 RX ORDER — NICOTINE POLACRILEX 4 MG
15 LOZENGE BUCCAL
Status: DISCONTINUED | OUTPATIENT
Start: 2022-05-25 | End: 2022-05-26 | Stop reason: HOSPADM

## 2022-05-25 RX ORDER — ASPIRIN 325 MG
325 TABLET ORAL EVERY 12 HOURS SCHEDULED
Status: DISCONTINUED | OUTPATIENT
Start: 2022-05-26 | End: 2022-05-26 | Stop reason: HOSPADM

## 2022-05-25 RX ORDER — HYDRALAZINE HYDROCHLORIDE 20 MG/ML
5 INJECTION INTRAMUSCULAR; INTRAVENOUS
Status: DISCONTINUED | OUTPATIENT
Start: 2022-05-25 | End: 2022-05-25 | Stop reason: HOSPADM

## 2022-05-25 RX ORDER — DIPHENHYDRAMINE HCL 25 MG
25 CAPSULE ORAL
Status: DISCONTINUED | OUTPATIENT
Start: 2022-05-25 | End: 2022-05-25 | Stop reason: HOSPADM

## 2022-05-25 RX ORDER — EPHEDRINE SULFATE 50 MG/ML
INJECTION, SOLUTION INTRAVENOUS AS NEEDED
Status: DISCONTINUED | OUTPATIENT
Start: 2022-05-25 | End: 2022-05-25 | Stop reason: SURG

## 2022-05-25 RX ORDER — POVIDONE-IODINE 10 MG/ML
SOLUTION TOPICAL ONCE
Status: COMPLETED | OUTPATIENT
Start: 2022-05-25 | End: 2022-05-25

## 2022-05-25 RX ORDER — PROMETHAZINE HYDROCHLORIDE 25 MG/1
25 SUPPOSITORY RECTAL ONCE AS NEEDED
Status: DISCONTINUED | OUTPATIENT
Start: 2022-05-25 | End: 2022-05-25 | Stop reason: HOSPADM

## 2022-05-25 RX ORDER — NALOXONE HCL 0.4 MG/ML
0.1 VIAL (ML) INJECTION
Status: DISCONTINUED | OUTPATIENT
Start: 2022-05-25 | End: 2022-05-26 | Stop reason: HOSPADM

## 2022-05-25 RX ORDER — LIDOCAINE HYDROCHLORIDE 10 MG/ML
0.5 INJECTION, SOLUTION EPIDURAL; INFILTRATION; INTRACAUDAL; PERINEURAL ONCE AS NEEDED
Status: DISCONTINUED | OUTPATIENT
Start: 2022-05-25 | End: 2022-05-25 | Stop reason: HOSPADM

## 2022-05-25 RX ORDER — EPHEDRINE SULFATE 50 MG/ML
5 INJECTION, SOLUTION INTRAVENOUS ONCE AS NEEDED
Status: DISCONTINUED | OUTPATIENT
Start: 2022-05-25 | End: 2022-05-25 | Stop reason: HOSPADM

## 2022-05-25 RX ORDER — SODIUM CHLORIDE, SODIUM LACTATE, POTASSIUM CHLORIDE, CALCIUM CHLORIDE 600; 310; 30; 20 MG/100ML; MG/100ML; MG/100ML; MG/100ML
9 INJECTION, SOLUTION INTRAVENOUS CONTINUOUS
Status: DISCONTINUED | OUTPATIENT
Start: 2022-05-25 | End: 2022-05-26 | Stop reason: HOSPADM

## 2022-05-25 RX ORDER — MAGNESIUM HYDROXIDE 1200 MG/15ML
LIQUID ORAL AS NEEDED
Status: DISCONTINUED | OUTPATIENT
Start: 2022-05-25 | End: 2022-05-25 | Stop reason: HOSPADM

## 2022-05-25 RX ORDER — PROMETHAZINE HYDROCHLORIDE 25 MG/1
25 TABLET ORAL ONCE AS NEEDED
Status: DISCONTINUED | OUTPATIENT
Start: 2022-05-25 | End: 2022-05-25 | Stop reason: HOSPADM

## 2022-05-25 RX ORDER — ONDANSETRON 4 MG/1
4 TABLET, FILM COATED ORAL EVERY 6 HOURS PRN
Status: DISCONTINUED | OUTPATIENT
Start: 2022-05-25 | End: 2022-05-26 | Stop reason: HOSPADM

## 2022-05-25 RX ORDER — SODIUM CHLORIDE 0.9 % (FLUSH) 0.9 %
3-10 SYRINGE (ML) INJECTION AS NEEDED
Status: DISCONTINUED | OUTPATIENT
Start: 2022-05-25 | End: 2022-05-25 | Stop reason: HOSPADM

## 2022-05-25 RX ORDER — DEXAMETHASONE SODIUM PHOSPHATE 10 MG/ML
INJECTION INTRAMUSCULAR; INTRAVENOUS AS NEEDED
Status: DISCONTINUED | OUTPATIENT
Start: 2022-05-25 | End: 2022-05-25 | Stop reason: SURG

## 2022-05-25 RX ORDER — OXYCODONE AND ACETAMINOPHEN 7.5; 325 MG/1; MG/1
1 TABLET ORAL EVERY 4 HOURS PRN
Status: DISCONTINUED | OUTPATIENT
Start: 2022-05-25 | End: 2022-05-25 | Stop reason: HOSPADM

## 2022-05-25 RX ORDER — CEFAZOLIN SODIUM IN 0.9 % NACL 3 G/100 ML
3 INTRAVENOUS SOLUTION, PIGGYBACK (ML) INTRAVENOUS ONCE
Status: COMPLETED | OUTPATIENT
Start: 2022-05-25 | End: 2022-05-25

## 2022-05-25 RX ORDER — HYDROCODONE BITARTRATE AND ACETAMINOPHEN 7.5; 325 MG/1; MG/1
1 TABLET ORAL ONCE AS NEEDED
Status: DISCONTINUED | OUTPATIENT
Start: 2022-05-25 | End: 2022-05-25 | Stop reason: HOSPADM

## 2022-05-25 RX ORDER — NALOXONE HCL 0.4 MG/ML
0.2 VIAL (ML) INJECTION AS NEEDED
Status: DISCONTINUED | OUTPATIENT
Start: 2022-05-25 | End: 2022-05-25 | Stop reason: HOSPADM

## 2022-05-25 RX ORDER — PHENYLEPHRINE HYDROCHLORIDE 10 MG/ML
INJECTION INTRAVENOUS AS NEEDED
Status: DISCONTINUED | OUTPATIENT
Start: 2022-05-25 | End: 2022-05-25 | Stop reason: SURG

## 2022-05-25 RX ORDER — SODIUM CHLORIDE 0.9 % (FLUSH) 0.9 %
3 SYRINGE (ML) INJECTION EVERY 12 HOURS SCHEDULED
Status: DISCONTINUED | OUTPATIENT
Start: 2022-05-25 | End: 2022-05-26 | Stop reason: HOSPADM

## 2022-05-25 RX ORDER — ATORVASTATIN CALCIUM 20 MG/1
20 TABLET, FILM COATED ORAL DAILY
Status: DISCONTINUED | OUTPATIENT
Start: 2022-05-25 | End: 2022-05-26 | Stop reason: HOSPADM

## 2022-05-25 RX ORDER — LEVOTHYROXINE SODIUM 0.1 MG/1
100 TABLET ORAL
Status: DISCONTINUED | OUTPATIENT
Start: 2022-05-26 | End: 2022-05-26 | Stop reason: HOSPADM

## 2022-05-25 RX ORDER — HYDROMORPHONE HYDROCHLORIDE 1 MG/ML
0.5 INJECTION, SOLUTION INTRAMUSCULAR; INTRAVENOUS; SUBCUTANEOUS
Status: DISCONTINUED | OUTPATIENT
Start: 2022-05-25 | End: 2022-05-25 | Stop reason: HOSPADM

## 2022-05-25 RX ORDER — HYDROCODONE BITARTRATE AND ACETAMINOPHEN 7.5; 325 MG/1; MG/1
1 TABLET ORAL EVERY 4 HOURS PRN
Status: DISCONTINUED | OUTPATIENT
Start: 2022-05-25 | End: 2022-05-26 | Stop reason: HOSPADM

## 2022-05-25 RX ORDER — LIDOCAINE HYDROCHLORIDE 20 MG/ML
INJECTION, SOLUTION INFILTRATION; PERINEURAL AS NEEDED
Status: DISCONTINUED | OUTPATIENT
Start: 2022-05-25 | End: 2022-05-25 | Stop reason: SURG

## 2022-05-25 RX ORDER — PANTOPRAZOLE SODIUM 40 MG/1
40 TABLET, DELAYED RELEASE ORAL EVERY MORNING
Status: DISCONTINUED | OUTPATIENT
Start: 2022-05-26 | End: 2022-05-26 | Stop reason: HOSPADM

## 2022-05-25 RX ORDER — MIDAZOLAM HYDROCHLORIDE 1 MG/ML
0.5 INJECTION INTRAMUSCULAR; INTRAVENOUS
Status: DISCONTINUED | OUTPATIENT
Start: 2022-05-25 | End: 2022-05-25 | Stop reason: HOSPADM

## 2022-05-25 RX ORDER — ACETAMINOPHEN 500 MG
1000 TABLET ORAL ONCE
Status: COMPLETED | OUTPATIENT
Start: 2022-05-25 | End: 2022-05-25

## 2022-05-25 RX ORDER — SODIUM CHLORIDE 0.9 % (FLUSH) 0.9 %
3 SYRINGE (ML) INJECTION EVERY 12 HOURS SCHEDULED
Status: DISCONTINUED | OUTPATIENT
Start: 2022-05-25 | End: 2022-05-25 | Stop reason: HOSPADM

## 2022-05-25 RX ORDER — DIPHENHYDRAMINE HYDROCHLORIDE 50 MG/ML
12.5 INJECTION INTRAMUSCULAR; INTRAVENOUS
Status: DISCONTINUED | OUTPATIENT
Start: 2022-05-25 | End: 2022-05-25 | Stop reason: HOSPADM

## 2022-05-25 RX ORDER — SODIUM CHLORIDE 0.9 % (FLUSH) 0.9 %
3-10 SYRINGE (ML) INJECTION AS NEEDED
Status: DISCONTINUED | OUTPATIENT
Start: 2022-05-25 | End: 2022-05-26 | Stop reason: HOSPADM

## 2022-05-25 RX ORDER — FUROSEMIDE 40 MG/1
40 TABLET ORAL DAILY
Status: DISCONTINUED | OUTPATIENT
Start: 2022-05-25 | End: 2022-05-25

## 2022-05-25 RX ORDER — BUPIVACAINE HYDROCHLORIDE 2.5 MG/ML
INJECTION, SOLUTION EPIDURAL; INFILTRATION; INTRACAUDAL
Status: COMPLETED | OUTPATIENT
Start: 2022-05-25 | End: 2022-05-25

## 2022-05-25 RX ORDER — ROCURONIUM BROMIDE 10 MG/ML
INJECTION, SOLUTION INTRAVENOUS AS NEEDED
Status: DISCONTINUED | OUTPATIENT
Start: 2022-05-25 | End: 2022-05-25 | Stop reason: SURG

## 2022-05-25 RX ADMIN — SODIUM CHLORIDE, POTASSIUM CHLORIDE, SODIUM LACTATE AND CALCIUM CHLORIDE 9 ML/HR: 600; 310; 30; 20 INJECTION, SOLUTION INTRAVENOUS at 09:54

## 2022-05-25 RX ADMIN — MIDAZOLAM 1 MG: 1 INJECTION, SOLUTION INTRAMUSCULAR; INTRAVENOUS at 10:48

## 2022-05-25 RX ADMIN — EPHEDRINE SULFATE 10 MG: 50 INJECTION INTRAVENOUS at 14:40

## 2022-05-25 RX ADMIN — PHENYLEPHRINE HYDROCHLORIDE 100 MCG: 10 INJECTION, SOLUTION INTRAVENOUS at 12:55

## 2022-05-25 RX ADMIN — FAMOTIDINE 20 MG: 10 INJECTION INTRAVENOUS at 10:11

## 2022-05-25 RX ADMIN — PHENYLEPHRINE HYDROCHLORIDE 100 MCG: 10 INJECTION, SOLUTION INTRAVENOUS at 13:12

## 2022-05-25 RX ADMIN — SODIUM CHLORIDE 3 G: 9 INJECTION, SOLUTION INTRAVENOUS at 12:29

## 2022-05-25 RX ADMIN — EPHEDRINE SULFATE 10 MG: 50 INJECTION INTRAVENOUS at 13:38

## 2022-05-25 RX ADMIN — LIDOCAINE HYDROCHLORIDE 100 MG: 20 INJECTION, SOLUTION INFILTRATION; PERINEURAL at 12:42

## 2022-05-25 RX ADMIN — BUPIVACAINE 10 ML: 13.3 INJECTION, SUSPENSION, LIPOSOMAL INFILTRATION at 11:30

## 2022-05-25 RX ADMIN — Medication 3 ML: at 20:47

## 2022-05-25 RX ADMIN — ONDANSETRON 4 MG: 2 INJECTION INTRAMUSCULAR; INTRAVENOUS at 15:03

## 2022-05-25 RX ADMIN — PHENYLEPHRINE HYDROCHLORIDE 100 MCG: 10 INJECTION, SOLUTION INTRAVENOUS at 13:02

## 2022-05-25 RX ADMIN — PHENYLEPHRINE HYDROCHLORIDE 100 MCG: 10 INJECTION, SOLUTION INTRAVENOUS at 13:22

## 2022-05-25 RX ADMIN — EPHEDRINE SULFATE 10 MG: 50 INJECTION INTRAVENOUS at 14:55

## 2022-05-25 RX ADMIN — POVIDONE-IODINE: 10 SOLUTION TOPICAL at 09:45

## 2022-05-25 RX ADMIN — PHENYLEPHRINE HYDROCHLORIDE 100 MCG: 10 INJECTION, SOLUTION INTRAVENOUS at 13:45

## 2022-05-25 RX ADMIN — EPHEDRINE SULFATE 10 MG: 50 INJECTION INTRAVENOUS at 13:14

## 2022-05-25 RX ADMIN — ATORVASTATIN CALCIUM 20 MG: 20 TABLET, FILM COATED ORAL at 18:50

## 2022-05-25 RX ADMIN — GLYCOPYRROLATE 0.2 MG: 0.2 INJECTION INTRAMUSCULAR; INTRAVENOUS at 12:40

## 2022-05-25 RX ADMIN — ACETAMINOPHEN 1000 MG: 500 TABLET ORAL at 10:11

## 2022-05-25 RX ADMIN — PHENYLEPHRINE HYDROCHLORIDE 200 MCG: 10 INJECTION, SOLUTION INTRAVENOUS at 13:14

## 2022-05-25 RX ADMIN — CEFAZOLIN SODIUM 3 G: 10 INJECTION, POWDER, FOR SOLUTION INTRAVENOUS at 21:21

## 2022-05-25 RX ADMIN — BUPIVACAINE HYDROCHLORIDE 20 ML: 2.5 INJECTION, SOLUTION EPIDURAL; INFILTRATION; INTRACAUDAL; PERINEURAL at 11:30

## 2022-05-25 RX ADMIN — GLYCOPYRROLATE 0.6 MG: 0.2 INJECTION INTRAMUSCULAR; INTRAVENOUS at 15:03

## 2022-05-25 RX ADMIN — PHENYLEPHRINE HYDROCHLORIDE 0.6 MCG/KG/MIN: 10 INJECTION INTRAVENOUS at 13:49

## 2022-05-25 RX ADMIN — NEOSTIGMINE METHYLSULFATE 3 MG: 0.5 INJECTION INTRAVENOUS at 15:03

## 2022-05-25 RX ADMIN — EPHEDRINE SULFATE 10 MG: 50 INJECTION INTRAVENOUS at 12:50

## 2022-05-25 RX ADMIN — FENTANYL CITRATE 50 MCG: 0.05 INJECTION, SOLUTION INTRAMUSCULAR; INTRAVENOUS at 10:48

## 2022-05-25 RX ADMIN — PHENYLEPHRINE HYDROCHLORIDE 100 MCG: 10 INJECTION, SOLUTION INTRAVENOUS at 13:38

## 2022-05-25 RX ADMIN — SODIUM CHLORIDE, POTASSIUM CHLORIDE, SODIUM LACTATE AND CALCIUM CHLORIDE: 600; 310; 30; 20 INJECTION, SOLUTION INTRAVENOUS at 14:17

## 2022-05-25 RX ADMIN — PHENYLEPHRINE HYDROCHLORIDE 100 MCG: 10 INJECTION, SOLUTION INTRAVENOUS at 13:25

## 2022-05-25 RX ADMIN — EPHEDRINE SULFATE 10 MG: 50 INJECTION INTRAVENOUS at 13:02

## 2022-05-25 RX ADMIN — PROPOFOL 200 MG: 10 INJECTION, EMULSION INTRAVENOUS at 12:42

## 2022-05-25 RX ADMIN — ROCURONIUM BROMIDE 50 MG: 50 INJECTION INTRAVENOUS at 12:42

## 2022-05-25 RX ADMIN — EPHEDRINE SULFATE 10 MG: 50 INJECTION INTRAVENOUS at 13:31

## 2022-05-25 RX ADMIN — KETOROLAC TROMETHAMINE 15 MG: 15 INJECTION, SOLUTION INTRAMUSCULAR; INTRAVENOUS at 18:50

## 2022-05-25 RX ADMIN — PREGABALIN 150 MG: 75 CAPSULE ORAL at 20:46

## 2022-05-25 RX ADMIN — EPHEDRINE SULFATE 10 MG: 50 INJECTION INTRAVENOUS at 13:08

## 2022-05-25 RX ADMIN — VENLAFAXINE HYDROCHLORIDE 75 MG: 75 CAPSULE, EXTENDED RELEASE ORAL at 20:46

## 2022-05-25 RX ADMIN — EPHEDRINE SULFATE 10 MG: 50 INJECTION INTRAVENOUS at 12:55

## 2022-05-25 RX ADMIN — DEXAMETHASONE SODIUM PHOSPHATE 8 MG: 10 INJECTION INTRAMUSCULAR; INTRAVENOUS at 12:51

## 2022-05-25 NOTE — ANESTHESIA PROCEDURE NOTES
Airway  Urgency: elective    Date/Time: 5/25/2022 12:45 PM  Airway not difficult    General Information and Staff    Patient location during procedure: OR  Anesthesiologist: Miguel Walker MD  CRNA/CAA: Ángela Huang CRNA    Indications and Patient Condition  Indications for airway management: airway protection    Preoxygenated: yes  Mask difficulty assessment: 1 - vent by mask    Final Airway Details  Final airway type: endotracheal airway      Successful airway: ETT  Cuffed: yes   Successful intubation technique: direct laryngoscopy  Facilitating devices/methods: intubating stylet  Endotracheal tube insertion site: oral  Blade: Terrell  Blade size: 3  ETT size (mm): 7.0  Cormack-Lehane Classification: grade I - full view of glottis  Placement verified by: chest auscultation and capnometry   Measured from: lips  ETT/EBT  to lips (cm): 21  Number of attempts at approach: 1  Assessment: lips, teeth, and gum same as pre-op and atraumatic intubation

## 2022-05-25 NOTE — PLAN OF CARE
Goal Outcome Evaluation:              Outcome Evaluation: Patient s/p rt total reverse shoulder, with arm sling, on O2 @ 3lpm, AxOx4, ambulated stretcher to BSC to bed with 2 assist, ongoing IV fluid, needs attended, will continue to monitor.SCD's on, encouraged IS.

## 2022-05-25 NOTE — ANESTHESIA PREPROCEDURE EVALUATION
" Anesthesia Evaluation     Patient summary reviewed and Nursing notes reviewed                Airway   Mallampati: II  TM distance: >3 FB  Neck ROM: full  Dental      Pulmonary    (+) a smoker Former, COPD, sleep apnea,   Cardiovascular     ECG reviewed  PT is on anticoagulation therapy  Patient on routine beta blocker and Beta blocker given within 24 hours of surgery  Rhythm: regular  Rate: normal    (+) hypertension, hyperlipidemia,       Neuro/Psych- negative ROS  GI/Hepatic/Renal/Endo    (+) morbid obesity, GERD,  diabetes mellitus type 2, thyroid problem hypothyroidism    Musculoskeletal (-) negative ROS    Abdominal    Substance History - negative use     OB/GYN negative ob/gyn ROS         Other                        Anesthesia Plan    ASA 3     general with block   (Right ISB with exparel PSR forPOPC    I have reviewed the patient's history with the patient and the chart, including all pertinent laboratory results and imaging. I have explained the risks of anesthesia including but not limited to dental damage, corneal abrasion, nerve injury, MI, stroke, and death. Questions asked and answered. Anesthetic plan discussed with patient and team as indicated. Patient expressed understanding of the above.   Goes by \"Ibis\")  intravenous induction     Anesthetic plan, all risks, benefits, and alternatives have been provided, discussed and informed consent has been obtained with: patient.    Plan discussed with CRNA.        CODE STATUS:       "

## 2022-05-25 NOTE — ANESTHESIA POSTPROCEDURE EVALUATION
Patient: Екатерина Jansen    Procedure Summary     Date: 05/25/22 Room / Location: [facility] OR  / [facility] MAIN OR    Anesthesia Start: 1233 Anesthesia Stop: 1530    Procedure: RIGHT TOTAL SHOULDER REVERSE ARTHROPLASTY (Right Shoulder) Diagnosis:     Surgeons: Rip Sandoval MD Provider: Miguel Walker MD    Anesthesia Type: general with block ASA Status: 3          Anesthesia Type: general with block    Vitals  Vitals Value Taken Time   /58 05/25/22 1556   Temp 36.3 °C (97.4 °F) 05/25/22 1527   Pulse 63 05/25/22 1600   Resp 18 05/25/22 1555   SpO2 95 % 05/25/22 1600   Vitals shown include unvalidated device data.        Post Anesthesia Care and Evaluation    Patient location during evaluation: PACU  Patient participation: complete - patient participated  Level of consciousness: awake and alert  Pain management: adequate  Airway patency: patent  Anesthetic complications: No anesthetic complications    Cardiovascular status: acceptable  Respiratory status: acceptable  Hydration status: acceptable    Comments: --------------------            05/25/22               1555     --------------------   BP:       117/58     Pulse:      65       Resp:       18       Temp:                SpO2:      94%      --------------------

## 2022-05-25 NOTE — OP NOTE
Right TOTAL SHOULDER REVERSE ARTHROPLASTY  Procedure Note    Екатерина Jansen  5/25/2022    Pre-op Diagnosis: Right rotator cuff arthropathy with massive rotator cuff tear, chronic biceps tendinitis of the right shoulder  Post-op Diagnosis: Same  Procedure:    Right reverse total shoulder arthroplasty CPT code 17126  Open tenodesis of the long head of the biceps tendon right shoulder CPT code 66846  Surgeon:  Rip Sandoval MD  Assistant: Miguel Oquendo NP, CFA  Anesthesia: General with Block, Anesthesiologist: Miguel Walker MD; Dmitriy Cunha MD  CRNA: Ángela Huang CRNA  Staff: Circulator: Shannon Conde RN; Elaina Callejas RN  Scrub Person: Amie Funez  Vendor Representative: Alen Rivera; Stephen Hernandez  Assistant: Miguel Oquendo APRN CFA  Estimated Blood Loss: 100ml  Specimens: * No orders in the log *  Drains: none  Complications: None    Components Utilized:    Implant Name Type Inv. Item Serial No.  Lot No. LRB No. Used Action   DEV CONTRL TISS STRATAFIX SPIRAL MNCRYL UD 3/0 PLS 30CM - [id number] Implant DEV CONTRL TISS STRATAFIX SPIRAL MNCRYL UD 3/0 PLS 30CM  [employer]  DIV OF J AND J . Right 1 Implanted   SUT FW #2 W/TPR NDL 1/2 CIR 38IN 97CM 26.5MM ROSHAN - [id number] Implant SUT FW #2 W/TPR NDL 1/2 CIR 38IN 97CM 26.5MM ROSHAN  [employer] [id number] Right 3 Implanted   SUT FW #2 W/TPR NDL 1/2 CIR 38IN 97CM 26.5MM ROSHAN - [id number] Implant SUT FW #2 W/TPR NDL 1/2 CIR 38IN 97CM 26.5MM ROSHAN  ARTHREX [id number] Right 1 Implanted   PIN FIX SHLDR W/BIT DISP PK/6 - [id number] Implant PIN FIX SHLDR W/BIT DISP PK/6  [employer] [place] [id number] Right 1 Implanted   BASEPLT OLLIE THRD [id number] 24.5X25MM - [id number] Implant BASEPLT OLLIE THRD [id number] 24.5X25MM  [employer] [place] [id number] Right 1 Implanted   SCRW OLLIE PA LK 4.5X22MM - [id number] Implant MYNOR GUERRA 4.5X22MM  [employer] [facility] [id number] Right 1 Implanted   MYNOR GUERRA 4.5X14MM - [id number] Implant MYNOR GUERRA 4.5X14MM   [employer] [place] [id number] Right 1 Implanted   GLENOSPHERE REV INF OFFST 36X24.5MM PLS4 - [id number] Implant GLENOSPHERE REV INF OFFST 36X24.5MM PLS4  [employer] [place] [id number] Right 1 Implanted   DIAPHYSIS HUM/SHLDR PRSS/FIT SZ10 12X59.1MM SHT - [id number] Implant DIAPHYSIS HUM/SHLDR PRSS/FIT SZ10 12X59.1MM SHT  [employer] [place] [id number] Right 1 Implanted   LINER REV HUM/SHLDR HXPE 36X24.4MM PLS6 - [id number] Implant LINER REV HUM/SHLDR HXPE 36X24.4MM PLS6  [employer] [place] [id number] Right 1 Implanted   METAPHYSIS REV HUM/SHLDR TI6L4V 0DEG 37.5X13MM PLS0 - [id number] Implant METAPHYSIS REV HUM/SHLDR [id number] 0DEG 37.5X13MM PLS0  [employer] [place] [id number] Right 1 Implanted       Indication for Procedure:  This patient is a 71 y.o. female who resides in a nursing facility.  She has debilitating pain in her right shoulder.  She has had several cortisone injections.  Work-up demonstrated rotator cuff pathology and findings consistent with rotator cuff arthropathy.  Risks and benefits for total shoulder arthroplasty, reverse were reviewed with the patient and she voiced understanding and did wish to proceed.  Surgical options and non-surgical options were discussed in detail and to the patient's satisfaction.  Surgical intervention was recommended based on the patient's injury and functional status.      The risks and benefits of surgery were discussed with patient and informed consent was obtained.  Risks include but are not limited to, infection, bleeding, nerve injury, blood clots, risks associated with anesthesia, need for further surgery, persistent pain, and possibly death.    Protocols for intravenous antibiotics and venous thrombosis were followed for this patient.  IV antibiotics were infused prior to surgery and will be discontinued within 24 hours of completion of the surgical procedure.       DESCRIPTION OF PROCEDURE:     The patient was seen in Preoperative Holding Area where their surgical site was marked. Preoperative antibiotics were  received. H&P and consent updated.  A preoperative nerve blockade was administered they were taken to the Operating Room and provided general anesthesia on the Operating Room table.  The patient was then placed in the beachchair position carefully.  The neck was carefully padded and positioned.  All bony prominences were padded.  Positioning was somewhat difficult secondary the patient's morbid obese body habitus.  Once we were prepped and draped using a 2-stage prep with alcohol and ChloraPrep,  Ahead and performed a surgical timeout confirmed the correct patient, procedure be performed laterality as well as Ancef had been administered within 60 minutes the incision.  All members of the team were in agreement at this point, I marked out the coracoid process a 10 blade scalpel was used to sharply incise the skin and subcutaneous tissue Bovie cautery was used to cauterize the vasculature in the subcutaneous tissue.  I went down to the fascia I was quickly able to identify the cephalic vein in the deltopectoral interval.  I dissected this using Metzenbaum scissors and then cauterized a few perforating branches of the cephalic vein.  I then retracted this laterally cleared out the subacromial as well as subdeltoid space using a Travis elevator.  I then used a Ly deltoid retractor.  I then incised the clavipectoral fascia.  The strap muscles were identified I placed a kobel retractor underneath these.  I then identified the anterior circumflex vessels and cauterized these.  The biceps was identified in the bicipital groove it was significantly boggy and inflamed.  I incised this and then pulled out of the groove I tagged this with a Vicryl suture for later repair.  I then identified the rotator cuff interval and carefully elevated the subscapularis in a peel fashion using Bovie cautery.  While doing this I externally rotated the shoulder and was able to dislocate the shoulder.  There was full-thickness tear of the  supraspinatus and the greater tuberosity was completely bald.  There was significant wear of the humeral head as well as central wear of the glenoid.  At this point, retractors were placed around the humeral head I debrided any inferior osteophytes.  Careful attention was paid to release the capsule around the inferior humeral neck protecting the axillary nerve.  At this point the humeral cut was performed using an extra medullary guide in about 30 degrees of retroversion.  I then sequentially broached up to a size 10 which was similar to my preoperative templating this had excellent rotational stability.  A calcar planer was utilized to revise the neck I then placed a stainless steel humeral head protector.  Attention was then directed towards the glenoid glenoid was exposed and retractors were placed posteriorly as well as anteriorly and a PCL retractor was used at the acromial region.  I then carefully debrided any labral remnant.  The glenoid did have mostly central wear but was very thin posteriorly.  Careful attention was paid to the placement of the pin using a guide I sized it at a 24.5 mm baseplate and I directed this with some inferior tilt as well as anterior tilt based upon my CT scan.  I placed the pin and confirmed it was bicortical I measured this at about 25 to 30 mm but selected a 25 mm baseplate.  I then drilled this and then tapped it.  The definitive baseplate was opened after reaming was performed I reamed just to get to bleeding bone mostly inferiorly.  The baseplate was then tightened down with excellent fixation.  Screw was placed both superiorly as well as inferiorly both with excellent purchase.  A circumferential reamer was then performed removing any residual bone or labral remnants from the baseplate.  I then impacted a definitive 36 mm glenosphere.  This was confirmed to be appropriately positioned and the offset was placed directly inferior.  I did have a few millimeters of inferior  overhang.  Next, attention was directed back towards the humeral side and the humeral broach was reexposed.  I reexamined this and I had excellent stability so I opened a definitive size 10 implant on the back table and I went ahead and placed sutures in the metaphyseal region for later repair of the subscapularis.  The definitive humeral stem was impacted with excellent stability no evidence of fracture.  The inlay reamer was utilized for the metaphyseal portion.  I trialed the metaphyseal portion with both a 3 and a 6 mm polyethylene insert and I felt that the shoulder was very stable with the 6 mm insert at 155 degrees neck shaft angle.  She tolerated abduction to 90 degrees forward flexion about 110 degrees with no impingement and she was able to extend and externally rotate with no instability.  The strap muscles were appropriately tightened as was the deltoid.  The shoulder was redislocated and I elected to implant this definitive construct so the metaphyseal portion and the polyethylene insert were opened on the back table.  These were impacted in the screw was tightened using the torque limiting  to appropriate specifications.  The shoulder was once again reduced the shoulder was irrigated and pulsatile saline visors 0.35% Betadine.  Attention was then directed towards the subscapularis repair and I used a total of four #2 FiberWire sutures in a figure-of-eight fashion to perform a transosseous repair as well as 1 suture went through the metaphyseal portion of the stem.  I ended up with fairly decent repair of the subscapularis though the muscle itself was fairly atrophied.  Next attention was directed towards the biceps tendon.  I performed a soft tissue tenodesis to the biceps into the distal one third of the pectoralis major insertion.  The remaining proximal portion of the biceps stump was excised.  This concluded the procedure the shoulder was again ranged was found to be very stable with no  impingement and good range of motion.  The wound was once again irrigated and copious sterile saline with Betadine.  The deltopectoral interval was loosely closed using 0 Vicryl suture as well as 0 Vicryl suture for the subcutaneous layer and then 2-0 Vicryl and 3-0 Monocryl and Dermabond for watertight closure of the subcuticular layer.  A sterile silver impregnated occlusive dressing was applied to the shoulder the patient was then placed in a sling and awakened from anesthetic without any complications    Postoperative Plan:  The patient received routine Ancef for antibiotic prophylaxis aspirin as well as SCDs for DVT prophylaxis she will be nonweightbearing to the right upper extremity and have a sling at all times.  Plan will be for overnight admission likely discharge back to her facility the next day.    Rip Sandoval MD

## 2022-05-25 NOTE — PRE-PROCEDURE NOTE
Patient did not receive Lyrica in pre-op from paper orders due to taking it this AM.     Holding RN to ask MD Sandoval at bedside if patient should receive Celebrex per paper order due to allergy to sulfa antibiotics.     Holding RN to ask MD Sandoval if there should be a change in Kefzol paper order due to allergies.

## 2022-05-25 NOTE — H&P
"        ORTHOPEDIC SURGERY PRE-OP HISTORY AND PHYSICAL      Patient: Екатерина Jansen  Date of Admission: 5/25/2022  8:44 AM  YOB: 1950  Medical Record Number: [id number]  Attending Physician: Rip Sandoval MD  Consulting Physician: Rip Sandoval MD    CHIEF COMPLAINT: Right shoulder pain.    HISTORY OF PRESENT ILLINESS: Patient is a 71 y.o. year old female presents to [facility] with above complaints.  The patient failed conservative treatment and patient requested surgical intervention.  She has successfully undergone a reverse left total shoulder arthroplasty by another physician in [place] a few years ago.  Had some pain in her initial postoperative course but then did well.  She does have evidence of rotator cuff arthropathy on the left side.  Risks and benefits of total shoulder arthroplasty were reviewed and discussed at length and she wishes to proceed.    ALLERGIES:   Allergies   Allergen Reactions   • Gabapentin Swelling     EDEMA     • Tuberculin Tests Other (See Comments)     STATES HAD REDNESS, \"FALSE POSITIVE\"   • Cefprozil Rash   • Cleocin [Clindamycin] Rash   • Clindamycin/Lincomycin Rash   • Sulfa Antibiotics Rash       HOME MEDICATIONS:  Medications Prior to Admission   Medication Sig Dispense Refill Last Dose   • acetaminophen (TYLENOL) 325 MG tablet Take 650 mg by mouth Every 6 (Six) Hours As Needed for Mild Pain  or Fever.   5/25/2022 at 0000   • atorvastatin (LIPITOR) 20 MG tablet Take 20 mg by mouth Daily.   5/24/2022 at 1800   • B Complex Vitamins (VITAMIN B COMPLEX PO) Take 1 tablet by mouth Daily.   5/24/2022 at 0800   • benzocaine (ORAJEL) 10 % mucosal gel Apply  to the mouth or throat Every 4 (Four) Hours As Needed for Mucositis.   5/25/2022 at 0000   • calcium carbonate (TUMS) 500 MG chewable tablet Chew 1 tablet Every 4 (Four) Hours As Needed for Indigestion or Heartburn.   5/24/2022 at 2000   • camphor-menthol (SARNA) 0.5-0.5 % lotion Apply 1 " application topically to the appropriate area as directed 2 (Two) Times a Day As Needed for Itching.   5/24/2022 at 2000   • esomeprazole (nexIUM) 20 MG capsule Take 20 mg by mouth Every Morning Before Breakfast.   5/24/2022 at 0800   • furosemide (LASIX) 40 MG tablet Take 40 mg by mouth Daily.   5/24/2022 at 0800   • hydrocortisone 1 % cream Apply 1 application topically to the appropriate area as directed Every 6 (Six) Hours As Needed (ITCHING).   5/24/2022 at 1200   • levothyroxine (SYNTHROID, LEVOTHROID) 100 MCG tablet Take 100 mcg by mouth Daily.   5/25/2022 at 0530   • linagliptin (TRADJENTA) 5 MG tablet tablet Take 5 mg by mouth Daily.   5/24/2022 at 0800   • melatonin 5 MG tablet tablet Take 5 mg by mouth Every Night.   5/24/2022 at 2200   • metFORMIN ER (GLUCOPHAGE-XR) 750 MG 24 hr tablet Take 750 mg by mouth 2 (Two) Times a Day.   5/24/2022 at 1800   • metoprolol succinate XL (TOPROL-XL) 50 MG 24 hr tablet Take 50 mg by mouth Daily. HOLD SBP <100 OR HR < 60   5/25/2022 at 0530   • mometasone (NASONEX) 50 MCG/ACT nasal spray 1 spray into the nostril(s) as directed by provider 2 (Two) Times a Day.   Past Week at Unknown time   • montelukast (SINGULAIR) 10 MG tablet Take 10 mg by mouth Daily.   5/24/2022 at 0800   • pregabalin (LYRICA) 150 MG capsule Take 150 mg by mouth 2 (Two) Times a Day.   5/25/2022 at 0530   • traMADol (ULTRAM) 50 MG tablet Take 50 mg by mouth 2 (Two) Times a Day As Needed for Moderate Pain .   Past Month at Unknown time   • venlafaxine XR (EFFEXOR-XR) 75 MG 24 hr capsule Take 75 mg by mouth Daily.   5/25/2022 at 0530   • vitamin B-6 (PYRIDOXINE) 50 MG tablet Take 50 mg by mouth Daily.   5/24/2022 at 0800   • aspirin 325 MG tablet Take 325 mg by mouth Daily. PT TO HOLD FOR SURGERY   5/18/2022   • Camphor-Eucalyptus-Menthol (VICKS VAPORUB EX) Apply 1 application topically 2 (Two) Times a Day As Needed.   More than a month at Unknown time   • guaiFENesin (MUCINEX) 600 MG 12 hr tablet Take  1,200 mg by mouth 2 (Two) Times a Day As Needed for Cough.   More than a month at Unknown time   • meclizine (ANTIVERT) 25 MG tablet Take 25 mg by mouth 3 (Three) Times a Day As Needed for Dizziness.   More than a month at Unknown time   • Menthol, Topical Analgesic, (Biofreeze) 4 % gel Apply 1 application topically As Needed.   More than a month at Unknown time   • naproxen sodium (ALEVE) 220 MG tablet Take 220 mg by mouth Daily As Needed. PT TO HOLD FOR SURGERY   More than a month at Unknown time   • vitamin D (ERGOCALCIFEROL) 1.25 MG (25273 UT) capsule capsule Take 50,000 Units by mouth 1 (One) Time Per Week. FRIDAYS 5/20/2022       Past Medical History:   Diagnosis Date   • Arthritis    • Cataract    • COPD (chronic obstructive pulmonary disease) (HCC)    • Diabetes mellitus (HCC)     TYPE 2   • Diverticulosis    • GERD (gastroesophageal reflux disease)    • History of COVID-19 01/2022    BEGINNING TO MIDDLE OF JANUARY   • History of migraine    • History of MRSA infection     [facility] IN [place], [place] LAST WAS AROUND 2019 OR 2020, RT FOOT.  AS OF 5/23/22 NO CURRENT OPEN WOUNDS.   • Hyperlipidemia    • Hypertension    • Hypothyroidism    • IBS (irritable bowel syndrome)    • Left leg weakness    • Low back pain    • Neuropathy     FEET   • Right shoulder pain    • Sleep apnea     PT STATES THEY HAVE NOT GIVEN HER A CPAP YET     Past Surgical History:   Procedure Laterality Date   • AMPUTATION FOOT / TOE Bilateral     LEFT GREAT TOE, RIGHT 2ND AND 4TH TOES   • CARPAL TUNNEL RELEASE WITH CUBITAL TUNNEL RELEASE Left    • COLONOSCOPY     • CYST REMOVAL Left     WRIST   • REPLACEMENT TOTAL KNEE Bilateral    • TENDON REPAIR Left     HAND   • TOTAL HIP ARTHROPLASTY Right    • TOTAL SHOULDER ARTHROPLASTY Left    • WRIST FRACTURE SURGERY Left      Social History     Occupational History   • Not on file   Tobacco Use   • Smoking status: Former Smoker     Packs/day: 0.50     Types: Cigarettes     Quit date:  1988     Years since quittin.4   • Smokeless tobacco: Never Used   • Tobacco comment: SMOKED OFF AND  22 YEARS   Vaping Use   • Vaping Use: Never used   Substance and Sexual Activity   • Alcohol use: Yes     Comment: SOCIALLY   • Drug use: Never   • Sexual activity: Defer      Social History     Social History Narrative   • Not on file     Family History   Problem Relation Age of Onset   • Malig Hyperthermia Neg Hx        REVIEW OF SYSTEMS:    HEENT: Patient denies any headaches, vision changes, change in hearing, or tinnitus, Patient denies epistaxis, sinus pain, hoarseness, or dysphagia   Pulmonary: Patient denies any cough, congestion, acute change in SOA or wheezing.   Cardiovascular: Patient denies any change in chest pain, dyspnea, palpitations, weakness, intolerance of exercise, varicosities, change in murmur   Gastrointestinal:  Patient denies change in appetite, melena, change in bowel habits.   Genital/Urinary: Patient denies dysuria, change in color of urine, change in frequency of urination, pain with urgency, change in incontinence, retention.   Musculoskeletal: Patient denies complaints of acute changes in symptoms of other joints not mentioned above.   Neurological: Patient denies changes in dizziness, tremor, ataxia, or difficulty in speaking or changes in memory.   Endocrine system: Patient denies acute changes in tremors, palpitations, polyuria, polydipsia, polyphagia, diaphoresis, exophthalmos, or goiter.   Psychological: Patient denies thoughts/plans or harming self or other; denies acute changes in depression,  insomnia, night terrors, mae, disorientation.   Skin: Patient denies any bruising, rashes, discoloration, pruritus,or wounds not mentioned in history of present illness or chief complaint above.   Hematopoietic: Patient denies current bleeding, epistaxis, hematuria, or melena.    PHYSICAL EXAM:   Vitals:  Vitals:    22 0933   BP: 131/55   BP Location: Left arm   Patient  "Position: Lying   Pulse: 66   Resp: 18   Temp: 98.7 °F (37.1 °C)   TempSrc: Oral   SpO2: 95%   Weight: 122 kg (269 lb 1.6 oz)   Height: 175.3 cm (69\")       General:  71 y.o. female who appears about stated age.    Alert, cooperative, in no acute distress                       Head:    Normocephalic, without obvious abnormality, atraumatic   Eyes:            Lids and lashes normal, conjunctivae and sclerae normal, no         icterus, no pallor, corneas clear, PERRLA   Ears:    Ears appear intact with no abnormalities noted   Throat:   No oral lesions, no thrush, oral mucosa moist   Neck:   No adenopathy, supple, trachea midline, no JVD   Back:     Limited exam shows no severe kyphosis present,no visible           erythema, no excessive  tenderness to palpation.    Lungs:     Respirations regular, even and unlabored.     Heart:    Normal rate, Pulses palpable   Chest Wall:    No abnormalities observed.   Abdomen:     Normal bowel sounds, no masses, no organomegaly, soft              non-tender, non-distended, no guarding, no rebound                      tenderness   Rectal:     Deferred   Pulses:   Pulses palpable and equal bilaterally   Skin:   No bleeding, bruising or rash   Lymph nodes:   No palpable adenopathy   Extremities:     The patient's right shoulder is evaluated no incisions are seen she does have weakness with forward flexion strength with abduction is 4/5 there is complete crepitance with internal and external rotation she has external rotation about 70 degrees.  Distally the extremity is neurovascular intact.: Skin intact.  Painful ROM.  NVI distally.      DIAGNOSTIC TEST:  Admission on 05/25/2022   Component Date Value Ref Range Status   • Glucose 05/25/2022 131 (A) 70 - 130 mg/dL Final    Meter: [id number] : [id number] Vicente ALVES       No results found.      ASSESSMENT:  Right shoulder osteoarthritis and rotator cuff arthropathy  Patient Active Problem List   Diagnosis   • H/O total " shoulder replacement, right       PLAN:    Reverse total shoulder arthroplasty    Risks and benefits of surgical intervention were discussed in detail with the patient.  Risks of infection, fracture, dislocation, extremity length discrepancy, neurovascular injury, persistent pain, medical risks, anesthetic risk, need for additional surgery, deep venous thrombosis, pulmonary embolism and death.      The above diagnosis and treatment plan was discussed with the patient and/or family.  They were educated in both non-surgical and surgical treatment options for their condition.   They were given the opportunity to ask questions and were answered to their satisfaction.  They agreed to proceed with the above treatment plan.        Rip Sandoval MD  Date: 5/25/2022

## 2022-05-25 NOTE — ANESTHESIA POSTPROCEDURE EVALUATION
Patient: Екатерина Jansen    Procedure Summary     Date: 05/25/22 Room / Location: [facility] OR [address] [place] [address] MAIN OR    Anesthesia Start: 1233 Anesthesia Stop:     Procedure: RIGHT TOTAL SHOULDER REVERSE ARTHROPLASTY (Right Shoulder) Diagnosis:     Surgeons: Rip Sandoval MD Provider: Miguel Walker MD    Anesthesia Type: general with block ASA Status: 3          Anesthesia Type: general with block    Vitals  Vitals Value Taken Time   /56 05/25/22 1541   Temp 36.3 °C (97.4 °F) 05/25/22 1527   Pulse 64 05/25/22 1551   Resp 16 05/25/22 1540   SpO2 94 % 05/25/22 1551   Vitals shown include unvalidated device data.        Post Anesthesia Care and Evaluation    Patient location during evaluation: PACU  Patient participation: complete - patient participated  Level of consciousness: awake and alert  Pain management: adequate  Airway patency: patent  Anesthetic complications: No anesthetic complications    Cardiovascular status: acceptable  Respiratory status: acceptable  Hydration status: acceptable    Comments: --------------------            05/25/22               1540     --------------------   BP:       125/56     Pulse:      67       Resp:       16       Temp:                SpO2:      95%      --------------------

## 2022-05-26 VITALS
WEIGHT: 269.1 LBS | DIASTOLIC BLOOD PRESSURE: 83 MMHG | OXYGEN SATURATION: 95 % | SYSTOLIC BLOOD PRESSURE: 146 MMHG | HEIGHT: 69 IN | BODY MASS INDEX: 39.86 KG/M2 | RESPIRATION RATE: 18 BRPM | TEMPERATURE: 97.5 F | HEART RATE: 70 BPM

## 2022-05-26 PROBLEM — N18.31 STAGE 3A CHRONIC KIDNEY DISEASE: Status: ACTIVE | Noted: 2022-05-26

## 2022-05-26 LAB
ANION GAP SERPL CALCULATED.3IONS-SCNC: 17.7 MMOL/L (ref 5–15)
BUN SERPL-MCNC: 21 MG/DL (ref 8–23)
BUN/CREAT SERPL: 17.6 (ref 7–25)
CALCIUM SPEC-SCNC: 8.6 MG/DL (ref 8.6–10.5)
CHLORIDE SERPL-SCNC: 104 MMOL/L (ref 98–107)
CO2 SERPL-SCNC: 18.3 MMOL/L (ref 22–29)
CREAT SERPL-MCNC: 1.19 MG/DL (ref 0.57–1)
DEPRECATED RDW RBC AUTO: 38.6 FL (ref 37–54)
EGFRCR SERPLBLD CKD-EPI 2021: 48.4 ML/MIN/1.73
ERYTHROCYTE [DISTWIDTH] IN BLOOD BY AUTOMATED COUNT: 12.8 % (ref 12.3–15.4)
GLUCOSE BLDC GLUCOMTR-MCNC: 149 MG/DL (ref 70–130)
GLUCOSE BLDC GLUCOMTR-MCNC: 183 MG/DL (ref 70–130)
GLUCOSE SERPL-MCNC: 152 MG/DL (ref 65–99)
HCT VFR BLD AUTO: 28.6 % (ref 34–46.6)
HGB BLD-MCNC: 9.6 G/DL (ref 12–15.9)
MCH RBC QN AUTO: 28.5 PG (ref 26.6–33)
MCHC RBC AUTO-ENTMCNC: 33.6 G/DL (ref 31.5–35.7)
MCV RBC AUTO: 84.9 FL (ref 79–97)
PLATELET # BLD AUTO: 197 10*3/MM3 (ref 140–450)
PMV BLD AUTO: 11.3 FL (ref 6–12)
POTASSIUM SERPL-SCNC: 4.8 MMOL/L (ref 3.5–5.2)
RBC # BLD AUTO: 3.37 10*6/MM3 (ref 3.77–5.28)
SARS-COV-2 RNA RESP QL NAA+PROBE: NOT DETECTED
SODIUM SERPL-SCNC: 140 MMOL/L (ref 136–145)
WBC NRBC COR # BLD: 11.52 10*3/MM3 (ref 3.4–10.8)

## 2022-05-26 PROCEDURE — A9270 NON-COVERED ITEM OR SERVICE: HCPCS | Performed by: INTERNAL MEDICINE

## 2022-05-26 PROCEDURE — 25010000002 CEFAZOLIN PER 500 MG: Performed by: ORTHOPAEDIC SURGERY

## 2022-05-26 PROCEDURE — 25010000002 KETOROLAC TROMETHAMINE PER 15 MG: Performed by: ORTHOPAEDIC SURGERY

## 2022-05-26 PROCEDURE — 63710000001 LINAGLIPTIN 5 MG TABLET: Performed by: INTERNAL MEDICINE

## 2022-05-26 PROCEDURE — A9270 NON-COVERED ITEM OR SERVICE: HCPCS | Performed by: ORTHOPAEDIC SURGERY

## 2022-05-26 PROCEDURE — 63710000001 MONTELUKAST 10 MG TABLET: Performed by: INTERNAL MEDICINE

## 2022-05-26 PROCEDURE — 63710000001: Performed by: INTERNAL MEDICINE

## 2022-05-26 PROCEDURE — 63710000001 ATORVASTATIN 20 MG TABLET: Performed by: ORTHOPAEDIC SURGERY

## 2022-05-26 PROCEDURE — 63710000001 PREGABALIN 75 MG CAPSULE: Performed by: ORTHOPAEDIC SURGERY

## 2022-05-26 PROCEDURE — 80048 BASIC METABOLIC PNL TOTAL CA: CPT | Performed by: ORTHOPAEDIC SURGERY

## 2022-05-26 PROCEDURE — 63710000001 VENLAFAXINE XR 75 MG CAPSULE SUSTAINED-RELEASE 24 HR: Performed by: ORTHOPAEDIC SURGERY

## 2022-05-26 PROCEDURE — G0378 HOSPITAL OBSERVATION PER HR: HCPCS

## 2022-05-26 PROCEDURE — A9270 NON-COVERED ITEM OR SERVICE: HCPCS | Performed by: NURSE PRACTITIONER

## 2022-05-26 PROCEDURE — U0003 INFECTIOUS AGENT DETECTION BY NUCLEIC ACID (DNA OR RNA); SEVERE ACUTE RESPIRATORY SYNDROME CORONAVIRUS 2 (SARS-COV-2) (CORONAVIRUS DISEASE [COVID-19]), AMPLIFIED PROBE TECHNIQUE, MAKING USE OF HIGH THROUGHPUT TECHNOLOGIES AS DESCRIBED BY CMS-2020-01-R: HCPCS | Performed by: ORTHOPAEDIC SURGERY

## 2022-05-26 PROCEDURE — 97165 OT EVAL LOW COMPLEX 30 MIN: CPT

## 2022-05-26 PROCEDURE — 97535 SELF CARE MNGMENT TRAINING: CPT

## 2022-05-26 PROCEDURE — 63710000001 INSULIN LISPRO (HUMAN) PER 5 UNITS: Performed by: ORTHOPAEDIC SURGERY

## 2022-05-26 PROCEDURE — 63710000001 ASPIRIN 325 MG TABLET: Performed by: ORTHOPAEDIC SURGERY

## 2022-05-26 PROCEDURE — 63710000001 PANTOPRAZOLE 40 MG TABLET DELAYED-RELEASE: Performed by: ORTHOPAEDIC SURGERY

## 2022-05-26 PROCEDURE — 97530 THERAPEUTIC ACTIVITIES: CPT

## 2022-05-26 PROCEDURE — 63710000001 VITAMIN B-6 50 MG TABLET: Performed by: INTERNAL MEDICINE

## 2022-05-26 PROCEDURE — 85027 COMPLETE CBC AUTOMATED: CPT | Performed by: NURSE PRACTITIONER

## 2022-05-26 PROCEDURE — 82962 GLUCOSE BLOOD TEST: CPT

## 2022-05-26 PROCEDURE — 97162 PT EVAL MOD COMPLEX 30 MIN: CPT

## 2022-05-26 PROCEDURE — 63710000001 LEVOTHYROXINE 100 MCG TABLET: Performed by: NURSE PRACTITIONER

## 2022-05-26 PROCEDURE — 63710000001 METOPROLOL SUCCINATE XL 50 MG TABLET SUSTAINED-RELEASE 24 HOUR: Performed by: NURSE PRACTITIONER

## 2022-05-26 RX ORDER — ASPIRIN 325 MG
325 TABLET ORAL EVERY 12 HOURS SCHEDULED
Qty: 28 TABLET | Refills: 0 | Status: SHIPPED | OUTPATIENT
Start: 2022-05-26 | End: 2022-06-09

## 2022-05-26 RX ORDER — LANOLIN ALCOHOL/MO/W.PET/CERES
50 CREAM (GRAM) TOPICAL DAILY
Status: DISCONTINUED | OUTPATIENT
Start: 2022-05-26 | End: 2022-05-26 | Stop reason: HOSPADM

## 2022-05-26 RX ORDER — GUAIFENESIN 600 MG/1
1200 TABLET, EXTENDED RELEASE ORAL 2 TIMES DAILY PRN
Status: DISCONTINUED | OUTPATIENT
Start: 2022-05-26 | End: 2022-05-26 | Stop reason: HOSPADM

## 2022-05-26 RX ORDER — MONTELUKAST SODIUM 10 MG/1
10 TABLET ORAL DAILY
Status: DISCONTINUED | OUTPATIENT
Start: 2022-05-26 | End: 2022-05-26 | Stop reason: HOSPADM

## 2022-05-26 RX ORDER — HYDROCODONE BITARTRATE AND ACETAMINOPHEN 7.5; 325 MG/1; MG/1
1 TABLET ORAL EVERY 6 HOURS PRN
Qty: 45 TABLET | Refills: 0 | Status: SHIPPED | OUTPATIENT
Start: 2022-05-26 | End: 2022-06-01

## 2022-05-26 RX ORDER — IPRATROPIUM BROMIDE AND ALBUTEROL SULFATE 2.5; .5 MG/3ML; MG/3ML
3 SOLUTION RESPIRATORY (INHALATION) EVERY 4 HOURS PRN
Status: DISCONTINUED | OUTPATIENT
Start: 2022-05-26 | End: 2022-05-26 | Stop reason: HOSPADM

## 2022-05-26 RX ORDER — CHOLECALCIFEROL (VITAMIN D3) 125 MCG
5 CAPSULE ORAL NIGHTLY PRN
Status: DISCONTINUED | OUTPATIENT
Start: 2022-05-26 | End: 2022-05-26 | Stop reason: HOSPADM

## 2022-05-26 RX ADMIN — Medication: at 12:25

## 2022-05-26 RX ADMIN — MONTELUKAST SODIUM 10 MG: 10 TABLET, FILM COATED ORAL at 14:16

## 2022-05-26 RX ADMIN — METOPROLOL SUCCINATE 50 MG: 50 TABLET, EXTENDED RELEASE ORAL at 08:44

## 2022-05-26 RX ADMIN — LINAGLIPTIN 5 MG: 5 TABLET, FILM COATED ORAL at 14:16

## 2022-05-26 RX ADMIN — Medication 50 MG: at 14:18

## 2022-05-26 RX ADMIN — KETOROLAC TROMETHAMINE 15 MG: 15 INJECTION, SOLUTION INTRAMUSCULAR; INTRAVENOUS at 06:12

## 2022-05-26 RX ADMIN — LEVOTHYROXINE SODIUM 100 MCG: 0.1 TABLET ORAL at 06:13

## 2022-05-26 RX ADMIN — PANTOPRAZOLE SODIUM 40 MG: 40 TABLET, DELAYED RELEASE ORAL at 06:12

## 2022-05-26 RX ADMIN — ATORVASTATIN CALCIUM 20 MG: 20 TABLET, FILM COATED ORAL at 08:43

## 2022-05-26 RX ADMIN — CEFAZOLIN SODIUM 3 G: 10 INJECTION, POWDER, FOR SOLUTION INTRAVENOUS at 04:24

## 2022-05-26 RX ADMIN — SODIUM CHLORIDE, POTASSIUM CHLORIDE, SODIUM LACTATE AND CALCIUM CHLORIDE 100 ML/HR: 600; 310; 30; 20 INJECTION, SOLUTION INTRAVENOUS at 00:34

## 2022-05-26 RX ADMIN — KETOROLAC TROMETHAMINE 15 MG: 15 INJECTION, SOLUTION INTRAMUSCULAR; INTRAVENOUS at 00:33

## 2022-05-26 RX ADMIN — Medication 3 ML: at 08:44

## 2022-05-26 RX ADMIN — VENLAFAXINE HYDROCHLORIDE 75 MG: 75 CAPSULE, EXTENDED RELEASE ORAL at 08:43

## 2022-05-26 RX ADMIN — INSULIN LISPRO 2 UNITS: 100 INJECTION, SOLUTION INTRAVENOUS; SUBCUTANEOUS at 12:25

## 2022-05-26 RX ADMIN — PREGABALIN 150 MG: 75 CAPSULE ORAL at 08:43

## 2022-05-26 RX ADMIN — ASPIRIN 325 MG: 325 TABLET ORAL at 08:43

## 2022-05-26 NOTE — CASE MANAGEMENT/SOCIAL WORK
Discharge Planning Assessment  [facility]     Patient Name: Екатерина Jansen  MRN: [id number]  Today's Date: 5/26/2022    Admit Date: 5/25/2022     Discharge Needs Assessment     Row Name 05/26/22 1218       Living Environment    People in Home facility resident    Unique Family Situation [facility].    Current Living Arrangements residential facility    Primary Care Provided by self;other (see comments)  [facility] nursing staff.    Provides Primary Care For no one, unable/limited ability to care for self;no one    Family Caregiver if Needed sibling(s)    Quality of Family Relationships helpful    Able to Return to Prior Arrangements yes       Resource/Environmental Concerns    Transportation Concerns none       Transition Planning    Patient/Family Anticipates Transition to inpatient rehabilitation facility    Patient/Family Anticipated Services at Transition     Transportation Anticipated family or friend will provide;health plan transportation       Discharge Needs Assessment    Readmission Within the Last 30 Days no previous admission in last 30 days    Equipment Currently Used at Home cane, straight    Discharge Facility/Level of Care Needs nursing facility, skilled    Provided Post Acute Provider List? N/A    N/A Provider List Comment Requests to return to [facility].               Discharge Plan     Row Name 05/26/22 1219       Plan    Plan [facility] -- Accepted.    Patient/Family in Agreement with Plan yes    Plan Comments Spoke with the patient, verified current information and explained the role of the CCP. Patient is a LTC resident a [facility]. She's overall dependent on the nursing staff at [facility] to assist with her ADLs. She uses a cane regularly. Patient plans to return to [facility]. Referral sent in [employer]. Spoke with Bailee[facility] who has accepted and said the pt is ok to return anytime. Patient is planned to return skilled under Medicare. Discharge orders for  today noted. Scheduled a [facility] Bariatric Wheelchair Van for today at 1600 (spoke with Rustam; confirmation #: [id number]). The approx cost of the transport is $141. Discussed with the patient who's unable to pay the cost of the transport at this time. Discussed with Silvia MCKINNEY RN who said [facility] can pay the transport cost. Updated the patient. [facility] updated [facility] and MICHELLE Garcia who are agreeable with the d/c plan. [facility] called and left a message for the pt's sister/Dorothy. Bailee is requesting a new rapid Covid-19 Test prior to d/c. Updated MICHELLE Liz. Await Covid-19 test result. Packet is on the chart.              Continued Care and Services - Admitted Since 5/25/2022     Destination Coordination complete.    Service Provider Request Status Selected Services Address Phone Fax Patient Preferred    [facility]   Selected Skilled Nursing [address] [address] [phone] --              Expected Discharge Date and Time     Expected Discharge Date Expected Discharge Time    May 26, 2022          Demographic Summary     Row Name 05/26/22 1218       General Information    Admission Type observation    Reason for Consult discharge planning    Preferred Language English       Contact Information    Permission Granted to Share Info With ;family/designee               Functional Status     Row Name 05/26/22 1218       Functional Status    Usual Activity Tolerance moderate       Functional Status, IADL    Medications independent    Meal Preparation assistive equipment;assistive equipment and person    Housekeeping assistive equipment;assistive equipment and person    Laundry assistive equipment;assistive equipment and person    Shopping assistive equipment and person       Mental Status Summary    Recent Changes in Mental Status/Cognitive Functioning no changes               Psychosocial     Row Name 05/26/22 1218       Intellectual Performance WDL    Level of  Consciousness Alert       Coping/Stress    Patient Personal Strengths able to adapt    Sources of Support sibling(s)    Reaction to Health Status accepting    Understanding of Condition and Treatment adequate understanding of medical condition;adequate understanding of treatment       Developmental Stage (Eriksson's)    Developmental Stage Stage 8 (65 years-death/Late Adulthood) Integrity vs. Despair                Elaina MCDONALD RN

## 2022-05-26 NOTE — PLAN OF CARE
Goal Outcome Evaluation:  Plan of Care Reviewed With: patient        Progress: no change  Outcome Evaluation: Patient is a 70 yo female who is POD1 rTSA. She lives at LTC facility and reports ind at baseline with SPC. She is NWB RUE, no ER > neutral, sling to RUE. Today she completed bed mobility and STS with CGA. She was able to ambulated 25ft using SPC with CGAx2 for safety as pt is unsteady on feet although no overt LOB. Educated provided regarding precautions and HEP for rTSA. Pt will continue to benefit from skilled PT to address functional deficits and increase level of independence with bed mobility, transfers, and gait. PT rec return to facility and receive PT services.

## 2022-05-26 NOTE — DISCHARGE PLACEMENT REQUEST
"Екатерина Jansen (71 y.o. Female)             Date of Birth   1950    Social Security Number       Address   [address] [place] [address]    Home Phone   [phone]    MRN   [id number]       Church   [employer]    Marital Status                               Admission Date   5/25/22    Admission Type   Elective    Admitting Provider   Rip Sandoval MD    Attending Provider   Rip Sandoval MD    Department, Room/Bed   [address], P892/1       Discharge Date       Discharge Disposition   Skilled Nursing Facility (DC - External)    Discharge Destination                               Attending Provider: Rip Sandoval MD    Allergies: Gabapentin, Tuberculin Tests, Cefprozil, Cleocin [Clindamycin], Clindamycin/lincomycin, Sulfa Antibiotics    Isolation: None   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)   Code Status: CPR   Advance Care Planning Activity    Ht: 175.3 cm (69\")   Wt: 122 kg (269 lb 1.6 oz)    Admission Cmt: None   Principal Problem: H/O total shoulder replacement, right [Z96.611]                 Active Insurance as of 5/25/2022     Primary Coverage     Payor Plan Insurance Group Employer/Plan Group    HUMANA MEDICARE REPLACEMENT HUMANA MEDICARE REPLACEMENT [id number]     Payor Plan Address Payor Plan Phone Number Payor Plan Fax Number Effective Dates    [address] [address] [phone]  1/1/2018 - None Entered    [facility] [id number]       Subscriber Name Subscriber Birth Date Member ID       ЕКАТЕРИНА JANSEN 1950 [id number]                 Emergency Contacts      (Rel.) Home Phone Work Phone Mobile Phone    JONAS BLANK (Sister) [phone] -- [phone]    FERMIN JANSEN (Son) [phone] -- [phone]              "

## 2022-05-26 NOTE — PROGRESS NOTES
Procedure(s):  RIGHT TOTAL SHOULDER REVERSE ARTHROPLASTY     LOS: 0 days     Subjective :   Patient seen and examined.  She is sitting up at bedside.  She is awake, alert, oriented.  She responds appropriately to questions.  She denies any new problems overnight.  Postop pain is well controlled as her regional block is still in effect.  She is beginning to have a small amount of sensation in parts of her hand, but otherwise her right arm and shoulder are still numb.  No CP, SoA, calf pain / calf swelling    Objective :    Vital signs in last 24 hours:  Vitals:    05/25/22 1749 05/25/22 2248 05/26/22 0130 05/26/22 0513   BP: 101/54 121/67 126/66 125/66   BP Location: Left arm Left arm Left arm Left arm  Comment: forearm   Patient Position: Lying Lying Lying Lying   Pulse: 64 68 70 63   Resp: 16 16 16 18   Temp: 97.3 °F (36.3 °C) 97 °F (36.1 °C) 96.7 °F (35.9 °C) 97 °F (36.1 °C)   TempSrc: Oral Oral Oral Oral   SpO2: 94% 96% 95% 96%   Weight:       Height:           PHYSICAL EXAM:  Patient is calm, in no acute distress, awake and oriented x 3.  Surgical dressing overlying deltopectoral incision is clean, dry and intact.  No drainage  No signs of infection.  No sensation to light touch in the axillary nerve distribution.  No sensation to light touch in the radial nerve distribution.  Decreased sensation to light touch in the median and ulnar nerve distributions of the hand.  Very limited active motion of the digits due to her nerve block, but she does demonstrate about 30 degrees of active digit flexion and extension.  Radial pulse palpable at 2+.  Hand is warm and well-perfused    LABS:  Results from last 7 days   Lab Units 05/26/22  0454   WBC 10*3/mm3 11.52*   HEMOGLOBIN g/dL 9.6*   HEMATOCRIT % 28.6*   PLATELETS 10*3/mm3 197     Results from last 7 days   Lab Units 05/23/22  1051   SODIUM mmol/L 138   POTASSIUM mmol/L 3.8   CHLORIDE mmol/L 102   CO2 mmol/L 22.8   BUN mg/dL 20   CREATININE mg/dL 1.10*   GLUCOSE  mg/dL 123*   CALCIUM mg/dL 9.5     Results from last 7 days   Lab Units 05/23/22  1051   INR  1.05       PORTABLE VIEWS OF THE RIGHT SHOULDER    CLINICAL HISTORY: Postop arthroplasty    2 views of the right shoulder demonstrate a total shoulder arthroplasty  that appears in satisfactory position.    This report was finalized on 5/25/2022 5:01 PM by Dr. Gerardo Hernandez M.D.     ASSESSMENT:  Status post Procedure(s):  RIGHT TOTAL SHOULDER REVERSE ARTHROPLASTY      Plan:  Continue Physical Therapy, increase mobility     NWB to the right upper extremity  Maintain sling immobilizer at all times except for hygiene and gentle pendulum/elbow motion exercises    Avoid external rotation past neutral or resisted internal rotation x6 weeks    ASA for VTE prophylaxis    Plan for routine postoperative follow-up in Dr. Sandoval's office in 3 weeks.    Dispo planning to return back to facility where she resides, likely today if meeting therapy goals    BROCK Patel    Date: 5/26/2022  Time: 06:25 EDT

## 2022-05-26 NOTE — DISCHARGE INSTRUCTIONS
"    Dr. Rip Sandoval Total Shoulder Joint Replacement Discharge Instructions:  Office Phone Number: [phone]    I. ACTIVITIES:  1. Exercises:  Complete exercise program as taught by the hospital physical therapist 2 times per day  You may remove your arm from the sling and start \"walking\" your fingers up the wall.    Remember to exercise your hand and fingers.  You can make a fist or squeeze a sock ball or small ball to minimize swelling and stiffness in your hand.  Exercise program will be advanced by the physical therapist  During the day be up ambulating every 2 hours (while awake) for short distances  Complete the ankle pump exercises at least 10 times per hour (while awake)  Elevate operative arm when in bed and for at least 30 minutes during the day.   Use cold packs 20-30 minutes approximately 5 times per day. This should be done before and after completing your exercises and at any time you are experiencing pain/ stiffness in your operative extremity.    II. Restrictions  No pushing, pulling, lifting, or weight bearing on operative extremity.  Continue to follow shoulder precautions as instructed by hospital physical therapist  No external rotation of arm past straight ahead of your body for 6 weeks.    No resisted internal rotation of arm for 6 weeks.        2. Activities of Daily Living:  No tub baths, hot tubs, or swimming pools for 4 weeks  The clear dressing with thin white gauze strip dressing is waterproof.  You may shower without covering the dressing.  After 7 days you may remove the dressing.  After dressing removal, do not scrub or rub the incision. Allow skin glue to fall off over the next few weeks.  After the dressing is removed, simply let the water run over the incision and pat dry.    II. Restrictions  Dr. Sandoval will discuss with you when you will be able to drive again.  (It will be at least 6 weeks.)  First week stay inside on even terrain. May go up and down stairs one stair at a " time utilizing the hand rail.  Once you feel confident, you may venture outside.          III. Precautions:  Everyone that comes near you should wash their hands  No elective dental, genital-urinary, or colon procedures or surgical procedures for 12 weeks after surgery unless absolutely necessary.   If dental work or surgical procedure is deemed absolutely necessary within 12 weeks of surgery, you will need to contact Dr. Alfaro office as you will need to take antibiotics 1 hour prior to any dental work (including teeth cleanings).  Dr. Sandoval will prescribe prophylactic antibiotics for all dental procedures for one year  as a precautionary measure to minimize risk of infection.  If you are a diabetic or take immunosuppressive medication, you may have to take prophylactic antibiotics the remainder of your life before dental work.    Avoid sick people. If you must be around someone who is ill, they should wear a mask.  Avoid visits to the Emergency Room or Urgent Care unless you are having a life threatening event.   Dr. Sandoval does not routinely place on stockings, but if you are having swelling in your feet or ankle then you may obtain stockings from your local pharmacy or [facility].   Stockings are to be placed on in the morning and removed at night. Monitor the stockings to ensure that any swelling is not causing the stockings to become too tight. In this case, remove stockings immediately.    IV. INCISION CARE:  Dr. Sandoval takes great care in closing your incision to give you the best opportunity for a healthy incision with minimal scarring. He places sutures below the skin surface that will eventually dissolve.  The incision is then covered with a skin glue which makes the incision water tight, and minimizes bleeding onto the dressing.  No staples are used.  Occasionally one of the buried stitches may come to the skin surface and may need to be removed.  Please resist the temptation of removing  the stitch by yourself.  Dr. Sandoval will be happy to remove it for you.    Bruising around the incision and chest wall and arm is normal and to be expected.  Please keep dressing in place at least until post-op day 7. You may remove and replace dressing before day 7 if the dressing begins to fall off or becomes saturated. Wash your hands and under your finger nails prior to dressing changes.  After day 7 as long as incision is dry and intact, you may leave the dressing off and open to air.    If dressing must be changed, utilize dry gauze and paper tape. Avoid touching the side of the gauze that goes against the incision with your hands.  No creams or ointments to the incision until permission given by Dr. Sandoval.  Do not touch or pick at the incision, or try to remove any sutures or skin glue.  Check dressing every day and notify surgeon immediately if any of the following signs or symptoms are noted:  Increase in redness  Increase in swelling of the entire extremity that does not go away with elevation.  Notify office that you may have a blood clot.    Drainage oozing from the incision  Pulling apart of the edges of the incision  Increase in overall body temperature (greater than 100.5 degrees)    V. Medications:   1. Anticoagulants: You will be discharged on an anticoagulant. This is a prophylactic medication that helps prevent blood clots during your post-operative period. The type and length of dosage varies based on your individual needs, procedure performed, and Dr. Alfaro' preference.  While taking the anticoagulant, you should avoid taking any additional aspirin than what is prescribed.   Notify surgeon immediately if any scarlett bleeding is noted in the urine, stool, emesis, or from the nose or the incision. Blood in the stool will often appear as black rather than red. Blood in urine may appear as pink. Blood in emesis may appear as brown/black like coffee grounds.  You will need to apply pressure for  longer periods of time to any cuts or abrasions to stop bleeding  Avoid alcohol while taking anticoagulants.    2. Stool Softeners: You will be at greater risk of constipation after surgery due to being less mobile and the pain medications.   Take stool softeners as instructed by your surgeon while on pain medications. Over the counter Colace 100 mg 1-2 capsules twice daily.   If stools become too loose or too frequent, please decreases the dosage or stop the stool softener.  If constipation occurs despite use of stool softeners, you are to continue the stool softeners and add a laxative (Milk of Magnesia 1 ounce daily as needed).  If no bowel movement occurs past 3 days, then purchase Magnesium citrate and drink 1/2 bottle every 8 hours (on ice tastes better) until success. If no bowel movement by post-operative day 5 please call Dr. Alfaro office for further instructions.   You may need to decrease or stop your pain medications if bowel movements to not occur.     Drink plenty of fluids, and eat fruits and vegetables during your recovery time.    3. Pain Medications utilized after surgery are narcotics and the law requires that the following information be given to all patients that are prescribed narcotics:  CLASSIFICATION: Pain medications are called Opioids and are narcotics  LEGALITIES: It is illegal to share narcotics with others and to drive within 24 hours of taking narcotics  POTENTIAL SIDE EFFECTS: Potential side effects of opioids include: nausea, vomiting, itching, dizziness, drowsiness, dry mouth, constipation, and difficulty urinating.  POTENTIAL ADVERSE EFFECTS:   Opioid tolerance can develop with use of pain medications and this simply means that it requires more and more of the medication to control pain; however, this is seen more in patients that use opioids for longer periods of time.  Opioid dependence can develop with use of Opioids and this simply means that to stop the medication can cause  "withdrawal symptoms; however, this is seen with patients that use Opioids for longer periods of time.  Opioid addiction can develop with use of Opioids and the incidence of this is very unlikely in patients who take the medications as ordered and stop the medications as instructed.  Opioid overdose can be dangerous, but is unlikely when the medication is taken as ordered and stopped when ordered. It is important not to mix opioids with alcohol or with and type of sedative such as Benadryl as this can lead to over sedation and respiratory difficulty.  DOSAGE:   Pain medications will need to be taken consistently for the first few days to decrease pain and promote adequate pain relief and participation in physical therapy.  After the initial surgical pain begins to resolve, you may begin to decrease the pain medication. By the end of 6 weeks, you should be off of pain medications except for before physical therapy or to help with pain when attempting to fall asleep.  Pain medications will be tapered to lesser dosages as you are further from your surgical day.  No pain medications will be provided after 3 months from surgery.     Refills will not be given by the office during evening hours, or weekends.  To seek refills on pain medications during the post-operative period, you must call the office 48 hours in advance to request the refill. The office will then notify you when to  the prescription. DO NOT wait until you are out of the medication to request a refill.  They can not be \"called in\" to the pharmacy.      V. FOLLOW-UP VISITS:  You will need to follow up in the office with Dr. Sandoval in 3 weeks.  Please call [phone] if you need to confirm or reschedule your appointment time.   If you have any concerns or suspected complications prior to your follow up visit, please call your surgeons office. Do not wait until your appointment time if you suspect complications. These will need to be addressed in the " office promptly.

## 2022-05-26 NOTE — CONSULTS
Patient Name:  Екатерина Jansen  YOB: 1950  MRN:  [id number]  Date of Admission:  5/25/2022  Date of Consult:  5/25/2022  Patient Care Team:  Sukhdeep Mccarthy MD as PCP - General (Family Medicine)    Inpatient Hospitalist Consult  Consult performed by: Nhi Franco APRN  Consult ordered by: Rip Sandoval MD  Reason for consult: Management of chronic medical conditions        Subjective   History of Present Illness  Ms. Jansen is a 71 y.o. female that has been admitted to [facility] following elective RIGHT TOTAL SHOULDER REVERSE ARTHROPLASTY.  She has been admitted to an orthopedic floor following surgery and we were asked to see and assist with her medical problems, specifically relating to her hypertension, COPD, type 2 diabetes mellitus, and hypothroidism.  At the time of my visit she denies any chest pain, SOA, nausea, vomiting or diarrhea.  She has tolerated a diet.  She does complain of expected postoperative discomfort.  She reports being in a normal state of health leading up to surgery.        Past Medical History:   Diagnosis Date   • Arthritis    • Cataract    • COPD (chronic obstructive pulmonary disease) (HCC)    • Diabetes mellitus (HCC)     TYPE 2   • Diverticulosis    • GERD (gastroesophageal reflux disease)    • History of COVID-19 01/2022    BEGINNING TO MIDDLE OF JANUARY   • History of migraine    • History of MRSA infection     [facility] IN [place], [place] LAST WAS AROUND 2019 OR 2020, RT FOOT.  AS OF 5/23/22 NO CURRENT OPEN WOUNDS.   • Hyperlipidemia    • Hypertension    • Hypothyroidism    • IBS (irritable bowel syndrome)    • Left leg weakness    • Low back pain    • Neuropathy     FEET   • Right shoulder pain    • Sleep apnea     PT STATES THEY HAVE NOT GIVEN HER A CPAP YET     Past Surgical History:   Procedure Laterality Date   • AMPUTATION FOOT / TOE Bilateral     LEFT GREAT TOE, RIGHT 2ND AND 4TH TOES   • CARPAL TUNNEL RELEASE  WITH CUBITAL TUNNEL RELEASE Left    • COLONOSCOPY     • CYST REMOVAL Left     WRIST   • REPLACEMENT TOTAL KNEE Bilateral    • TENDON REPAIR Left     HAND   • TOTAL HIP ARTHROPLASTY Right    • TOTAL SHOULDER ARTHROPLASTY Left    • WRIST FRACTURE SURGERY Left      Family History   Problem Relation Age of Onset   • Malig Hyperthermia Neg Hx      Social History     Tobacco Use   • Smoking status: Former Smoker     Packs/day: 0.50     Types: Cigarettes     Quit date:      Years since quittin.4   • Smokeless tobacco: Never Used   • Tobacco comment: SMOKED OFF AND  22 YEARS   Vaping Use   • Vaping Use: Never used   Substance Use Topics   • Alcohol use: Yes     Comment: SOCIALLY   • Drug use: Never     Medications Prior to Admission   Medication Sig Dispense Refill Last Dose   • acetaminophen (TYLENOL) 325 MG tablet Take 650 mg by mouth Every 6 (Six) Hours As Needed for Mild Pain  or Fever.   2022 at 0000   • atorvastatin (LIPITOR) 20 MG tablet Take 20 mg by mouth Daily.   2022 at 1800   • B Complex Vitamins (VITAMIN B COMPLEX PO) Take 1 tablet by mouth Daily.   2022 at 0800   • benzocaine (ORAJEL) 10 % mucosal gel Apply  to the mouth or throat Every 4 (Four) Hours As Needed for Mucositis.   2022 at 0000   • calcium carbonate (TUMS) 500 MG chewable tablet Chew 1 tablet Every 4 (Four) Hours As Needed for Indigestion or Heartburn.   2022 at 2000   • camphor-menthol (SARNA) 0.5-0.5 % lotion Apply 1 application topically to the appropriate area as directed 2 (Two) Times a Day As Needed for Itching.   2022 at 2000   • esomeprazole (nexIUM) 20 MG capsule Take 20 mg by mouth Every Morning Before Breakfast.   2022 at 0800   • furosemide (LASIX) 40 MG tablet Take 40 mg by mouth Daily.   2022 at 0800   • hydrocortisone 1 % cream Apply 1 application topically to the appropriate area as directed Every 6 (Six) Hours As Needed (ITCHING).   2022 at 1200   • levothyroxine (SYNTHROID,  LEVOTHROID) 100 MCG tablet Take 100 mcg by mouth Daily.   5/25/2022 at 0530   • linagliptin (TRADJENTA) 5 MG tablet tablet Take 5 mg by mouth Daily.   5/24/2022 at 0800   • melatonin 5 MG tablet tablet Take 5 mg by mouth Every Night.   5/24/2022 at 2200   • metFORMIN ER (GLUCOPHAGE-XR) 750 MG 24 hr tablet Take 750 mg by mouth 2 (Two) Times a Day.   5/24/2022 at 1800   • metoprolol succinate XL (TOPROL-XL) 50 MG 24 hr tablet Take 50 mg by mouth Daily. HOLD SBP <100 OR HR < 60   5/25/2022 at 0530   • mometasone (NASONEX) 50 MCG/ACT nasal spray 1 spray into the nostril(s) as directed by provider 2 (Two) Times a Day.   Past Week at Unknown time   • montelukast (SINGULAIR) 10 MG tablet Take 10 mg by mouth Daily.   5/24/2022 at 0800   • pregabalin (LYRICA) 150 MG capsule Take 150 mg by mouth 2 (Two) Times a Day.   5/25/2022 at 0530   • traMADol (ULTRAM) 50 MG tablet Take 50 mg by mouth 2 (Two) Times a Day As Needed for Moderate Pain .   Past Month at Unknown time   • venlafaxine XR (EFFEXOR-XR) 75 MG 24 hr capsule Take 75 mg by mouth Daily.   5/25/2022 at 0530   • vitamin B-6 (PYRIDOXINE) 50 MG tablet Take 50 mg by mouth Daily.   5/24/2022 at 0800   • aspirin 325 MG tablet Take 325 mg by mouth Daily. PT TO HOLD FOR SURGERY   5/18/2022   • Camphor-Eucalyptus-Menthol (VICKS VAPORUB EX) Apply 1 application topically 2 (Two) Times a Day As Needed.   More than a month at Unknown time   • guaiFENesin (MUCINEX) 600 MG 12 hr tablet Take 1,200 mg by mouth 2 (Two) Times a Day As Needed for Cough.   More than a month at Unknown time   • meclizine (ANTIVERT) 25 MG tablet Take 25 mg by mouth 3 (Three) Times a Day As Needed for Dizziness.   More than a month at Unknown time   • Menthol, Topical Analgesic, (Biofreeze) 4 % gel Apply 1 application topically As Needed.   More than a month at Unknown time   • naproxen sodium (ALEVE) 220 MG tablet Take 220 mg by mouth Daily As Needed. PT TO HOLD FOR SURGERY   More than a month at Unknown  time   • vitamin D (ERGOCALCIFEROL) 1.25 MG (76448 UT) capsule capsule Take 50,000 Units by mouth 1 (One) Time Per Week. FRIDAYS 5/20/2022     Allergies:  Gabapentin, Tuberculin tests, Cefprozil, Cleocin [clindamycin], Clindamycin/lincomycin, and Sulfa antibiotics    Review of Systems   Constitutional: Negative for chills and fever.   HENT: Negative for congestion and sore throat.    Eyes: Negative for photophobia and visual disturbance.   Respiratory: Negative for cough and shortness of breath.    Cardiovascular: Negative for chest pain, palpitations and leg swelling.   Gastrointestinal: Negative for abdominal pain, nausea and vomiting.   Endocrine: Negative for polydipsia, polyphagia and polyuria.   Musculoskeletal: Positive for arthralgias and myalgias.   Skin: Negative for rash and wound.   Neurological: Positive for numbness (RUE). Negative for dizziness, light-headedness and headaches.       Objective      Vital Signs  Temp:  [97.3 °F (36.3 °C)-98.7 °F (37.1 °C)] 97.3 °F (36.3 °C)  Heart Rate:  [58-75] 64  Resp:  [14-18] 16  BP: (101-131)/(50-76) 101/54  Body mass index is 39.74 kg/m².    Physical Exam  Vitals and nursing note reviewed.   Constitutional:       Appearance: Normal appearance. She is obese.   HENT:      Head: Normocephalic and atraumatic.      Nose: Nose normal.      Mouth/Throat:      Mouth: Mucous membranes are moist.      Pharynx: Oropharynx is clear.   Eyes:      Extraocular Movements: Extraocular movements intact.      Conjunctiva/sclera: Conjunctivae normal.   Cardiovascular:      Rate and Rhythm: Normal rate and regular rhythm.      Pulses: Normal pulses.      Heart sounds: Normal heart sounds.   Pulmonary:      Effort: Pulmonary effort is normal.      Breath sounds: Normal breath sounds.   Abdominal:      General: Bowel sounds are normal.      Palpations: Abdomen is soft.   Musculoskeletal:         General: No swelling or tenderness.      Cervical back: Normal range of motion and neck  supple.      Right lower leg: No edema.      Left lower leg: No edema.      Comments: Right arm in sling, decreased ROM   Skin:     General: Skin is warm and dry.   Neurological:      General: No focal deficit present.      Mental Status: She is oriented to person, place, and time.   Psychiatric:         Mood and Affect: Mood normal.         Behavior: Behavior normal.         Results Review:   I reviewed the patient's new clinical results.  I reviewed the patient's new imaging results and agree with the interpretation.  I reviewed the patient's other test results and agree with the interpretation         Assessment/Plan     Active Hospital Problems    Diagnosis  POA   • **H/O total shoulder replacement, right [Z96.611]  Not Applicable   • COPD (chronic obstructive pulmonary disease) (HCC) [J44.9]  Unknown   • HTN (hypertension) [I10]  Unknown   • Hypothyroidism [E03.9]  Unknown   • Type 2 diabetes mellitus, without long-term current use of insulin (HCC) [E11.9]  Unknown   • GERD without esophagitis [K21.9]  Unknown      Resolved Hospital Problems   No resolved problems to display.       Ms. Jansen is a 71 y.o. female who is s/p RIGHT TOTAL SHOULDER REVERSE ARTHROPLASTY.    · She seems to be doing well thus far postoperatively.   · POD #0 from right total shoulder reverse arthroplasty  · Continue plan per orthopedic surgery    Type 2 Diabetes Mellitus  · Hold home oral diabetic medications.  · ADMELOG - moderate dose correctional factor as needed.  · Monitor glucose TID AC. For any BG less than 70 mg/dL, treat per hospital protocol  · HgbA1C and fasting BMP ordered for in the morning.  Will review.    · NCS diet.    Hypertension  · Most recent BP is 101/54.  Will hold Lasix.  Anticipate fluctuations in BP due to blood loss, hypovolemia, anesthesia, narcotics etc.   · Holding parameters have been written for Metoprolol.  · Continue IVFs as ordered.    · Monitor renal function.      COPD/EDSON  · She does not have a  home CPAP.  · Supplemental oxygen as needed to keep sats greater than or equal to 90%.  · Continue home inhalers.    Hypothyroidism  · Continue Levothyroxine.    GERD/Depression/Hyperlipidemia  · Stable chronic conditions. Continue home medications.    · DVT prophylaxis per surgery.  · She was encouraged to use incentive spirometer as instructed.      Thank you very much for asking [facility] to be involved in this patient's care. We will follow along with you.      BROCK Samuels  [facility]  05/25/22  22:47 EDT

## 2022-05-26 NOTE — DISCHARGE SUMMARY
Discharge Summary    Date of Admission: 5/25/2022  8:44 AM  Date of Discharge:  5/26/2022    Discharge Diagnosis:   H/O total shoulder replacement, right [Z96.611]      PMHX:   Past Medical History:   Diagnosis Date   • Arthritis    • Cataract    • COPD (chronic obstructive pulmonary disease) (HCC)    • Diabetes mellitus (HCC)     TYPE 2   • Diverticulosis    • GERD (gastroesophageal reflux disease)    • History of COVID-19 01/2022    BEGINNING TO MIDDLE OF JANUARY   • History of migraine    • History of MRSA infection     [facility] IN [place], [place] LAST WAS AROUND 2019 OR 2020, RT FOOT.  AS OF 5/23/22 NO CURRENT OPEN WOUNDS.   • Hyperlipidemia    • Hypertension    • Hypothyroidism    • IBS (irritable bowel syndrome)    • Left leg weakness    • Low back pain    • Neuropathy     FEET   • Right shoulder pain    • Sleep apnea     PT STATES THEY HAVE NOT GIVEN HER A CPAP YET       Discharge Disposition  Skilled Nursing Facility ([place] - [facility])    Procedures Performed  Procedure(s):  RIGHT TOTAL SHOULDER REVERSE ARTHROPLASTY       Indication for Admission  Patient is a 71 y.o. female admitted after undergoing the above surgical procedure. They were admitted for post-operative pain control, medical management and physical therapy.  They progressed with physical therapy.   They were deemed stable for discharge.      Consults:   Consults     Date and Time Order Name Status Description    5/25/2022  5:49 PM Inpatient Hospitalist Consult Completed           Discharge Instructions:  Keep the right arm in the sling at all times until first follow-up visit.  Segmental he be removed for pendulum exercises.  Remain nonweightbearing to the right upper extremity   Dressing is waterproof. Patient may shower 3 days after the operation with the dressing in place. Replace the dressing if it becomes wet or saturated. Use compression stockings for leg swelling.  Patient will follow-up in the office in 3 weeks.  Call the  office at [phone] for any questions or concerns.      Discharge Medications     Discharge Medications      New Medications      Instructions Start Date   HYDROcodone-acetaminophen 7.5-325 MG per tablet  Commonly known as: NORCO   1 tablet, Oral, Every 6 Hours PRN         Changes to Medications      Instructions Start Date   aspirin 325 MG tablet  What changed: Another medication with the same name was added. Make sure you understand how and when to take each.   325 mg, Oral, Daily, PT TO HOLD FOR SURGERY      aspirin 325 MG tablet  What changed: You were already taking a medication with the same name, and this prescription was added. Make sure you understand how and when to take each.   325 mg, Oral, Every 12 Hours Scheduled         Continue These Medications      Instructions Start Date   acetaminophen 325 MG tablet  Commonly known as: TYLENOL   650 mg, Oral, Every 6 Hours PRN      atorvastatin 20 MG tablet  Commonly known as: LIPITOR   20 mg, Oral, Daily      benzocaine 10 % mucosal gel  Commonly known as: ORAJEL   Mouth/Throat, Every 4 Hours PRN      Biofreeze 4 % gel  Generic drug: Menthol (Topical Analgesic)   1 application, Apply externally, As Needed      calcium carbonate 500 MG chewable tablet  Commonly known as: TUMS   1 tablet, Oral, Every 4 Hours PRN      camphor-menthol 0.5-0.5 % lotion  Commonly known as: SARNA   1 application, Topical, 2 Times Daily PRN      esomeprazole 20 MG capsule  Commonly known as: nexIUM   20 mg, Oral, Every Morning Before Breakfast      furosemide 40 MG tablet  Commonly known as: LASIX   40 mg, Oral, Daily      guaiFENesin 600 MG 12 hr tablet  Commonly known as: MUCINEX   1,200 mg, Oral, 2 Times Daily PRN      hydrocortisone 1 % cream   1 application, Topical, Every 6 Hours PRN      levothyroxine 100 MCG tablet  Commonly known as: SYNTHROID, LEVOTHROID   100 mcg, Oral, Daily      linagliptin 5 MG tablet tablet  Commonly known as: TRADJENTA   5 mg, Oral, Daily       meclizine 25 MG tablet  Commonly known as: ANTIVERT   25 mg, Oral, 3 Times Daily PRN      melatonin 5 MG tablet tablet   5 mg, Oral, Nightly      metFORMIN  MG 24 hr tablet  Commonly known as: GLUCOPHAGE-XR   750 mg, Oral, 2 Times Daily      metoprolol succinate XL 50 MG 24 hr tablet  Commonly known as: TOPROL-XL   50 mg, Oral, Daily, HOLD SBP <100 OR HR < 60      mometasone 50 MCG/ACT nasal spray  Commonly known as: NASONEX   1 spray, Nasal, 2 Times Daily      montelukast 10 MG tablet  Commonly known as: SINGULAIR   10 mg, Oral, Daily      naproxen sodium 220 MG tablet  Commonly known as: ALEVE   220 mg, Oral, Daily PRN, PT TO HOLD FOR SURGERY      pregabalin 150 MG capsule  Commonly known as: LYRICA   150 mg, Oral, 2 Times Daily      traMADol 50 MG tablet  Commonly known as: ULTRAM   50 mg, Oral, 2 Times Daily PRN      venlafaxine XR 75 MG 24 hr capsule  Commonly known as: EFFEXOR-XR   75 mg, Oral, Daily      VICKS VAPORUB EX   1 application, Apply externally, 2 Times Daily PRN      VITAMIN B COMPLEX PO   1 tablet, Oral, Daily      vitamin B-6 50 MG tablet  Commonly known as: PYRIDOXINE   50 mg, Oral, Daily      vitamin D 1.25 MG (12917 UT) capsule capsule  Commonly known as: ERGOCALCIFEROL  Notes to patient: Take as directed.   50,000 Units, Oral, Weekly, FRIDAYS             Discharge Diet: Regular diet    Activity at Discharge: Nonweightbearing to the right upper extremity sling in place at all times except for pendulum exercises  Follow-up Appointments  No future appointments.           05/26/22,  12:17 ROSELYNT    Rip Sandoval MD  Orthopaedic Surgeon

## 2022-05-26 NOTE — THERAPY EVALUATION
Patient Name: Екатерина Jansen  : 1950    MRN: [id number]                              Today's Date: 2022       Admit Date: 2022    Visit Dx:     ICD-10-CM ICD-9-CM   1. H/O total shoulder replacement, right  Z96.611 V43.61     Patient Active Problem List   Diagnosis   • H/O total shoulder replacement, right   • COPD (chronic obstructive pulmonary disease) (HCC)   • HTN (hypertension)   • Hypothyroidism   • Type 2 diabetes mellitus, without long-term current use of insulin (HCC)   • GERD without esophagitis   • Stage 3a chronic kidney disease (HCC)     Past Medical History:   Diagnosis Date   • Arthritis    • Cataract    • COPD (chronic obstructive pulmonary disease) (HCC)    • Diabetes mellitus (HCC)     TYPE 2   • Diverticulosis    • GERD (gastroesophageal reflux disease)    • History of COVID-19 2022    BEGINNING TO MIDDLE OF JANUARY   • History of migraine    • History of MRSA infection     [facility] IN [place], [place] LAST WAS AROUND 2019 OR , RT FOOT.  AS OF 22 NO CURRENT OPEN WOUNDS.   • Hyperlipidemia    • Hypertension    • Hypothyroidism    • IBS (irritable bowel syndrome)    • Left leg weakness    • Low back pain    • Neuropathy     FEET   • Right shoulder pain    • Sleep apnea     PT STATES THEY HAVE NOT GIVEN HER A CPAP YET     Past Surgical History:   Procedure Laterality Date   • AMPUTATION FOOT / TOE Bilateral     LEFT GREAT TOE, RIGHT 2ND AND 4TH TOES   • CARPAL TUNNEL RELEASE WITH CUBITAL TUNNEL RELEASE Left    • COLONOSCOPY     • CYST REMOVAL Left     WRIST   • REPLACEMENT TOTAL KNEE Bilateral    • TENDON REPAIR Left     HAND   • TOTAL HIP ARTHROPLASTY Right    • TOTAL SHOULDER ARTHROPLASTY Left    • TOTAL SHOULDER ARTHROPLASTY W/ DISTAL CLAVICLE EXCISION Right 2022    Procedure: RIGHT TOTAL SHOULDER REVERSE ARTHROPLASTY;  Surgeon: Rip Sandoval MD;  [facility];  Service: Orthopedics;  Laterality: Right;   • WRIST FRACTURE SURGERY Left        General Information     Row Name 05/26/22 1224          OT Time and Intention    Document Type evaluation;progress note/recertification  -LE     Mode of Treatment individual therapy;occupational therapy  -     Row Name 05/26/22 [facility]          General Information    Patient Profile Reviewed yes  -LE     Prior Level of Function independent:  reports doing ADL, walk to dining room at cane level without assist. has been at NH since L shoulder surgery a few years ago.  -LE     Existing Precautions/Restrictions fall  Black sling and swath at all times except hygiene and exercises. NWB surgery UE. Precautions listed on HEP and review with pt  -LE     Row Name 05/26/22 122          Living Environment    People in Home facility resident  -     Row Name 05/26/22 1224          Cognition    Orientation Status (Cognition) oriented x 4  -     Row Name 05/26/22 [facility]          Safety Issues, Functional Mobility    Impairments Affecting Function (Mobility) range of motion (ROM);balance  -LE           User Key  (r) = Recorded By, (t) = Taken By, (c) = Cosigned By    Initials Name Provider Type    Isabel Espitia OTR Occupational Therapist                 Mobility/ADL's     Row Name 05/26/22 [facility]          Bed Mobility    Comment, (Bed Mobility) in chair when enter.  -     Row Name 05/26/22 1226          Transfers    Transfers sit-stand transfer;stand-sit transfer  -LE     Bed-Chair Todd (Transfers) contact guard  -LE     Sit-Stand Todd (Transfers) contact guard  -     Row Name 05/26/22 122          Activities of Daily Living    BADL Assessment/Intervention lower body dressing;upper body dressing  -     Row Name 05/26/22 122          Mobility    Right Upper Extremity (Weight-bearing Status) non weight-bearing (NWB)  -     Row Name 05/26/22 [facility]          Lower Body Dressing Assessment/Training    Todd Level (Lower Body Dressing) don;pants/bottoms;maximum assist (25% patient effort);verbal cues   -LE     Position (Lower Body Dressing) supported sitting;unsupported standing  -LE     Comment, (Lower Body Dressing) dependent to thread underwear and pants and mod A to hike.  ed pt will also need assist with socks and shoes.  -     Row Name 05/26/22 1226          Upper Body Dressing Assessment/Training    Payne Level (Upper Body Dressing) don;pull-over garment;maximum assist (25% patient effort)  -LE     Position (Upper Body Dressing) supported sitting  -LE     Comment, (Upper Body Dressing) Ed thread tech to thread sleeve over surgery arm first.   Ed purpose, proper positioning and how to kade/doff sling. Ed will need assist with task at d/c  -LE           User Key  (r) = Recorded By, (t) = Taken By, (c) = Cosigned By    Initials Name Provider Type    Isabel Espitia OTR Occupational Therapist               Obj/Interventions     Row Name 05/26/22 1228          Sensory Assessment (Somatosensory)    Sensory Assessment Block to surgery UE.  -     Row Name 05/26/22 1228          Range of Motion Comprehensive    Comment, General Range of Motion can wiggle fingers, wrist, supination.  1/2 elbow flexion  -     Row Name 05/26/22 1228          Motor Skills    Therapeutic Exercise --  Issue shoulder HEP & return demo by pt. OT demo pendulum and update HEP to do pendulum seated unless has help to to standing with therapy.   Ed complete HEP 1X10, 5-6 times a day & to hold exercises on own until block worn off & full sensation returned.  -     Row Name 05/26/22 1228          Balance    Static Sitting Balance standby assist  -LE     Static Standing Balance contact guard  one wobble when stand to hike pants.  -LE           User Key  (r) = Recorded By, (t) = Taken By, (c) = Cosigned By    Initials Name Provider Type    Isabel Espitia OTR Occupational Therapist               Goals/Plan     Row Name 05/26/22 1234          ROM Goal 1 (OT)    ROM Goal 1 (OT) Pt  to return demo and verbalized understanding of HEP  for UE with focus on pendulum.  -LE     Time Frame (ROM Goal 1, OT) 2 weeks  -LE     Progress/Outcome (ROM Goal 1, OT) goal ongoing  -LE     Row Name 05/26/22 1234          Problem Specific Goal 1 (OT)    Problem Specific Goal 1 (OT) Pt to verbalized understanding of precautions, dressing tech with immobilized UE and purpose and position of sling.  -LE     Time Frame (Problem Specific Goal 1, OT) 1 day  -LE     Progress/Outcome (Problem Specific Goal 1, OT) goal met  -LE     Row Name 05/26/22 1234          Therapy Assessment/Plan (OT)    Planned Therapy Interventions (OT) patient/caregiver education/training;ROM/therapeutic exercise;BADL retraining  -LE           User Key  (r) = Recorded By, (t) = Taken By, (c) = Cosigned By    Initials Name Provider Type    Isabel Espitia OTR Occupational Therapist               Clinical Impression     Row Name 05/26/22 1233          Pain Assessment    Pretreatment Pain Rating 0/10 - no pain  -     Row Name 05/26/22 1233          Plan of Care Review    Plan of Care Reviewed With patient  -LE     Outcome Evaluation Pt POD #1 R TSRA and seen by OT for UE HEP review and demo and ed/demo ADL tasks and sling use.  Pt plans to return to facility with assist of staff and recommend therapy at d/c. Will follow while in acute care  -     Row Name 05/26/22 1233          Therapy Assessment/Plan (OT)    Rehab Potential (OT) good, to achieve stated therapy goals  -LE     Criteria for Skilled Therapeutic Interventions Met (OT) meets criteria;yes  -LE     Therapy Frequency (OT) 5 times/wk  -LE     Row Name 05/26/22 1233          Therapy Plan Review/Discharge Plan (OT)    Anticipated Discharge Disposition (OT) skilled nursing facility  PT per ortho MD  -ERNESTO     Row Name 05/26/22 1233          Vital Signs    O2 Delivery Pre Treatment room air  -LE     Pre Patient Position Sitting  -LE     Intra Patient Position Standing  -LE     Post Patient Position Sitting  -LE     Row Name 05/26/22 1233           Positioning and Restraints    Pre-Treatment Position sitting in chair/recliner  -LE     Post Treatment Position chair  -LE     In Chair reclined;call light within reach;encouraged to call for assist;exit alarm on;notified nsg;REHANA elevated  -LE           User Key  (r) = Recorded By, (t) = Taken By, (c) = Cosigned By    Initials Name Provider Type    Isabel Espitia OTR Occupational Therapist               Outcome Measures     Row Name 05/26/22 1235          How much help from another is currently needed...    Putting on and taking off regular lower body clothing? 2  -LE     Bathing (including washing, rinsing, and drying) 2  -LE     Toileting (which includes using toilet bed pan or urinal) 2  -LE     Putting on and taking off regular upper body clothing 2  -LE     Taking care of personal grooming (such as brushing teeth) 3  -LE     Row Name 05/26/22 1226          How much help from another person do you currently need...    Turning from your back to your side while in flat bed without using bedrails? 3  -CB     Moving from lying on back to sitting on the side of a flat bed without bedrails? 3  -CB     Moving to and from a bed to a chair (including a wheelchair)? 3  -CB     Standing up from a chair using your arms (e.g., wheelchair, bedside chair)? 3  -CB     Climbing 3-5 steps with a railing? 2  -CB     To walk in hospital room? 3  -CB     AM-PAC 6 Clicks Score (PT) 17  -CB     Highest level of mobility 5 --> Static standing  -CB     Row Name 05/26/22 1235 05/26/22 1226       Functional Assessment    Outcome Measure Options AM-PAC 6 Clicks Daily Activity (OT)  -LE AM-PAC 6 Clicks Basic Mobility (PT)  -CB          User Key  (r) = Recorded By, (t) = Taken By, (c) = Cosigned By    Initials Name Provider Type    Isabel Espitia, OTR Occupational Therapist    Naina Restrepo PT Physical Therapist                Occupational Therapy Education                 Title: PT OT SLP Therapies (Done)     Topic:  Occupational Therapy (Done)     Point: ADL training (Done)     Description:   Instruct learner(s) on proper safety adaptation and remediation techniques during self care or transfers.   Instruct in proper use of assistive devices.              Learning Progress Summary           Patient Acceptance, E,TB,D,H, Bed IU by ERNESTO at 5/26/2022 1236                   Point: Home exercise program (Done)     Description:   Instruct learner(s) on appropriate technique for monitoring, assisting and/or progressing therapeutic exercises/activities.              Learning Progress Summary           Patient Acceptance, E,TB,D,H, Bed IU by ERNESTO at 5/26/2022 1236                   Point: Precautions (Done)     Description:   Instruct learner(s) on prescribed precautions during self-care and functional transfers.              Learning Progress Summary           Patient Acceptance, E,TB,D,H, Bed IU by  at 5/26/2022 1236                               User Key     Initials Effective Dates Name Provider Type Discipline     06/16/21 -  Isabel Gaston OTR Occupational Therapist OT              OT Recommendation and Plan  Planned Therapy Interventions (OT): patient/caregiver education/training, ROM/therapeutic exercise, BADL retraining  Therapy Frequency (OT): 5 times/wk  Plan of Care Review  Plan of Care Reviewed With: patient  Outcome Evaluation: Pt POD #1 R TSRA and seen by OT for UE HEP review and demo and ed/demo ADL tasks and sling use.  Pt plans to return to facility with assist of staff and recommend therapy at d/c. Will follow while in acute care     Time Calculation:    Time Calculation- OT     Row Name 05/26/22 1236             Time Calculation- OT    OT Start Time 1122  -LE      OT Stop Time 1144  -LE      OT Time Calculation (min) 22 min  -LE      Total Timed Code Minutes- OT 12 minute(s)  -LE      OT Received On 05/26/22  -LE      OT - Next Appointment 05/27/22  -LE      OT Goal Re-Cert Due Date 06/09/22  -LE              Timed  Charges    02099 - OT Self Care/Mgmt Minutes 12  -LE              Untimed Charges    OT Eval/Re-eval Minutes 10  -LE              Total Minutes    Timed Charges Total Minutes 12  -LE      Untimed Charges Total Minutes 10  -LE       Total Minutes 22  -LE            User Key  (r) = Recorded By, (t) = Taken By, (c) = Cosigned By    Initials Name Provider Type    Isabel Espitia OTR Occupational Therapist              Therapy Charges for Today     Code Description Service Date Service Provider Modifiers Qty    [id number]  OT EVAL LOW COMPLEXITY 2 5/26/2022 Isabel Gaston OTR GO 1    [id number]  OT SELF CARE/MGMT/TRAIN EA 15 MIN 5/26/2022 Isabel Gaston OTR GO 1               CORTNEY Morejon  5/26/2022

## 2022-05-26 NOTE — PLAN OF CARE
Goal Outcome Evaluation:  Plan of Care Reviewed With: patient           Outcome Evaluation: Pt POD #1 R TSRA and seen by OT for UE HEP review and demo and ed/demo ADL tasks and sling use.  Pt plans to return to facility with assist of staff and recommend therapy at d/c. Will follow while in acute care

## 2022-05-26 NOTE — THERAPY EVALUATION
Patient Name: Екатерина Jansen  : 1950    MRN: [id number]                              Today's Date: 2022       Admit Date: 2022    Visit Dx:     ICD-10-CM ICD-9-CM   1. H/O total shoulder replacement, right  Z96.611 V43.61     Patient Active Problem List   Diagnosis   • H/O total shoulder replacement, right   • COPD (chronic obstructive pulmonary disease) (HCC)   • HTN (hypertension)   • Hypothyroidism   • Type 2 diabetes mellitus, without long-term current use of insulin (HCC)   • GERD without esophagitis   • Stage 3a chronic kidney disease (HCC)     Past Medical History:   Diagnosis Date   • Arthritis    • Cataract    • COPD (chronic obstructive pulmonary disease) (HCC)    • Diabetes mellitus (HCC)     TYPE 2   • Diverticulosis    • GERD (gastroesophageal reflux disease)    • History of COVID-19 2022    BEGINNING TO MIDDLE OF JANUARY   • History of migraine    • History of MRSA infection     [facility] IN [place], [place] LAST WAS AROUND 2019 OR , RT FOOT.  AS OF 22 NO CURRENT OPEN WOUNDS.   • Hyperlipidemia    • Hypertension    • Hypothyroidism    • IBS (irritable bowel syndrome)    • Left leg weakness    • Low back pain    • Neuropathy     FEET   • Right shoulder pain    • Sleep apnea     PT STATES THEY HAVE NOT GIVEN HER A CPAP YET     Past Surgical History:   Procedure Laterality Date   • AMPUTATION FOOT / TOE Bilateral     LEFT GREAT TOE, RIGHT 2ND AND 4TH TOES   • CARPAL TUNNEL RELEASE WITH CUBITAL TUNNEL RELEASE Left    • COLONOSCOPY     • CYST REMOVAL Left     WRIST   • REPLACEMENT TOTAL KNEE Bilateral    • TENDON REPAIR Left     HAND   • TOTAL HIP ARTHROPLASTY Right    • TOTAL SHOULDER ARTHROPLASTY Left    • TOTAL SHOULDER ARTHROPLASTY W/ DISTAL CLAVICLE EXCISION Right 2022    Procedure: RIGHT TOTAL SHOULDER REVERSE ARTHROPLASTY;  Surgeon: Rip Sandoval MD;  Location: [facility];  Service: Orthopedics;  Laterality: Right;   • WRIST FRACTURE SURGERY Left        General Information     Row Name 05/26/22 [facility]0          Physical Therapy Time and Intention    Document Type evaluation  -CB     Mode of Treatment individual therapy;physical therapy  -CB     Row Name 05/26/22 1220          General Information    Patient Profile Reviewed yes  -CB     Prior Level of Function independent:;gait;transfer;bed mobility  SPC  -CB     Existing Precautions/Restrictions fall  NWB RUE  -CB     Barriers to Rehab none identified  -CB     Row Name 05/26/22 1220          Living Environment    People in Home facility resident  -CB     Row Name 05/26/22 1220          Home Main Entrance    Number of Stairs, Main Entrance none  -CB     Row Name 05/26/22 1220          Stairs Within Home, Primary    Number of Stairs, Within Home, Primary none  -CB     Row Name 05/26/22 1220          Cognition    Orientation Status (Cognition) oriented x 3  -CB     Row Name 05/26/22 1220          Safety Issues, Functional Mobility    Impairments Affecting Function (Mobility) endurance/activity tolerance;strength;range of motion (ROM);balance  -CB           User Key  (r) = Recorded By, (t) = Taken By, (c) = Cosigned By    Initials Name Provider Type    CB Naina Jo, PT Physical Therapist               Mobility     Row Name 05/26/22 1222          Bed Mobility    Bed Mobility supine-sit  -CB     Supine-Sit [place] (Bed Mobility) contact guard;verbal cues  -CB     Row Name 05/26/22 1222          Bed-Chair Transfer    Bed-Chair [place] (Transfers) contact guard;verbal cues  -CB     Assistive Device (Bed-Chair Transfers) cane, straight  -CB     Row Name 05/26/22 1222          Sit-Stand Transfer    Sit-Stand [place] (Transfers) contact guard;verbal cues  -CB     Assistive Device (Sit-Stand Transfers) cane, straight  -CB     Comment, (Sit-Stand Transfer) STSX3  -CB     Row Name 05/26/22 1222          Gait/Stairs (Locomotion)    [place] Level (Gait) contact guard;2 person assist;verbal cues  -CB      Assistive Device (Gait) cane, straight  -CB     Distance in Feet (Gait) 25ft  -CB     Deviations/Abnormal Patterns (Gait) gait speed decreased;stride length decreased;antalgic  -CB     Comment, (Gait/Stairs) no overt LOB. minimal unsteadiness noted  -CB     Row Name 05/26/22 1222          Mobility    Extremity Weight-bearing Status right upper extremity  -CB     Right Upper Extremity (Weight-bearing Status) non weight-bearing (NWB)  -CB           User Key  (r) = Recorded By, (t) = Taken By, (c) = Cosigned By    Initials Name Provider Type    CB Naina Jo, PT Physical Therapist               Obj/Interventions     Row Name 05/26/22 1223          Range of Motion Comprehensive    General Range of Motion bilateral lower extremity ROM WFL  -CB     Row Name 05/26/22 1223          Strength Comprehensive (MMT)    Comment, General Manual Muscle Testing (MMT) Assessment generalized weakness  -CB     Row Name 05/26/22 1223          Motor Skills    Therapeutic Exercise --  educated regarding hand, wrist, and elbow ROM  -CB     Row Name 05/26/22 1223          Balance    Balance Assessment sitting static balance;sitting dynamic balance;standing dynamic balance;standing static balance  -CB     Static Sitting Balance modified independence  -CB     Dynamic Sitting Balance modified independence  -CB     Position, Sitting Balance sitting in chair  -CB     Static Standing Balance contact guard  -CB     Dynamic Standing Balance contact guard;2-person assist  -CB     Position/Device Used, Standing Balance supported;cane, straight  -CB     Balance Interventions sitting;standing;sit to stand;static;supported;minimal challenge;dynamic  -CB     Row Name 05/26/22 1223          Sensory Assessment (Somatosensory)    Sensory Assessment (Somatosensory) --  pt reports RUE still numb from sx  -CB           User Key  (r) = Recorded By, (t) = Taken By, (c) = Cosigned By    Initials Name Provider Type    Naina Restrepo, PT Physical Therapist                Goals/Plan     Row Name 05/26/22 1225          Bed Mobility Goal 1 (PT)    Activity/Assistive Device (Bed Mobility Goal 1, PT) bed mobility activities, all  -CB     [place] Level/Cues Needed (Bed Mobility Goal 1, PT) modified independence  -CB     Time Frame (Bed Mobility Goal 1, PT) 1 week;long term goal (LTG)  -CB     Row Name 05/26/22 1225          Transfer Goal 1 (PT)    Activity/Assistive Device (Transfer Goal 1, PT) sit-to-stand/stand-to-sit;bed-to-chair/chair-to-bed  -CB     [place] Level/Cues Needed (Transfer Goal 1, PT) standby assist  -CB     Time Frame (Transfer Goal 1, PT) long term goal (LTG);1 week  -CB     Row Name 05/26/22 1225          Gait Training Goal 1 (PT)    Activity/Assistive Device (Gait Training Goal 1, PT) gait (walking locomotion);cane, straight  -CB     [place] Level (Gait Training Goal 1, PT) standby assist  -CB     Distance (Gait Training Goal 1, PT) 100ft  -CB     Time Frame (Gait Training Goal 1, PT) long term goal (LTG);1 week  -CB     Row Name 05/26/22 1225          Therapy Assessment/Plan (PT)    Planned Therapy Interventions (PT) balance training;bed mobility training;gait training;home exercise program;patient/family education;strengthening;transfer training;ROM (range of motion)  -CB           User Key  (r) = Recorded By, (t) = Taken By, (c) = Cosigned By    Initials Name Provider Type    Naina Restrepo, PT Physical Therapist               Clinical Impression     Row Name 05/26/22 1225          Pain    Pretreatment Pain Rating 0/10 - no pain  -CB     Posttreatment Pain Rating 0/10 - no pain  -CB     Row Name 05/26/22 1225          Plan of Care Review    Plan of Care Reviewed With patient  -CB     Progress no change  -CB     Outcome Evaluation Patient is a 70 yo female who is POD1 rTSA. She lives at LT facility and reports ind at baseline with SPC. She is NWB RUE, no ER > neutral, sling to RUE. Today she completed bed mobility and STS with CGA. She  was able to ambulated 25ft using SPC with CGAx2 for safety as pt is unsteady on feet although no overt LOB. Educated provided regarding precautions and HEP for rTSA. Pt will continue to benefit from skilled PT to address functional deficits and increase level of independence with bed mobility, transfers, and gait. PT rec return to facility and receive PT services.  -CB     Row Name 05/26/22 1225          Therapy Assessment/Plan (PT)    Rehab Potential (PT) good, to achieve stated therapy goals  -CB     Criteria for Skilled Interventions Met (PT) yes  -CB     Therapy Frequency (PT) 6 times/wk  -CB     Row Name 05/26/22 1225          Positioning and Restraints    Pre-Treatment Position in bed  -CB     Post Treatment Position chair  -CB     In Chair notified nsg;exit alarm on;encouraged to call for assist;call light within reach;reclined  RUE in sling  -CB           User Key  (r) = Recorded By, (t) = Taken By, (c) = Cosigned By    Initials Name Provider Type    CB Naina Jo, PT Physical Therapist               Outcome Measures     Row Name 05/26/22 1226          How much help from another person do you currently need...    Turning from your back to your side while in flat bed without using bedrails? 3  -CB     Moving from lying on back to sitting on the side of a flat bed without bedrails? 3  -CB     Moving to and from a bed to a chair (including a wheelchair)? 3  -CB     Standing up from a chair using your arms (e.g., wheelchair, bedside chair)? 3  -CB     Climbing 3-5 steps with a railing? 2  -CB     To walk in hospital room? 3  -CB     AM-PAC 6 Clicks Score (PT) 17  -CB     Highest level of mobility 5 --> Static standing  -CB     Row Name 05/26/22 1235 05/26/22 1226       Functional Assessment    Outcome Measure Options AM-PAC 6 Clicks Daily Activity (OT)  -LE AM-PAC 6 Clicks Basic Mobility (PT)  -CB          User Key  (r) = Recorded By, (t) = Taken By, (c) = Cosigned By    Initials Name Provider Type    ERNESTO  Isabel Gaston, OTR Occupational Therapist    Naina Restrepo, PT Physical Therapist                             Physical Therapy Education                 Title: PT OT SLP Therapies (Done)     Topic: Physical Therapy (Done)     Point: Mobility training (Done)     Learning Progress Summary           Patient Acceptance, E,TB,D, VU,NR by  at 5/26/2022 1226                   Point: Home exercise program (Done)     Learning Progress Summary           Patient Acceptance, E,TB,D, VU,NR by  at 5/26/2022 1226                   Point: Body mechanics (Done)     Learning Progress Summary           Patient Acceptance, E,TB,D, VU,NR by  at 5/26/2022 1226                   Point: Precautions (Done)     Learning Progress Summary           Patient Acceptance, E,TB,D, VU,NR by  at 5/26/2022 1226                               User Key     Initials Effective Dates Name Provider Type Discipline     10/22/21 -  Naina Jo, PT Physical Therapist PT              PT Recommendation and Plan  Planned Therapy Interventions (PT): balance training, bed mobility training, gait training, home exercise program, patient/family education, strengthening, transfer training, ROM (range of motion)  Plan of Care Reviewed With: patient  Progress: no change  Outcome Evaluation: Patient is a 70 yo female who is POD1 rTSA. She lives at LTC facility and reports ind at baseline with SPC. She is NWB RUE, no ER > neutral, sling to RUE. Today she completed bed mobility and STS with CGA. She was able to ambulated 25ft using SPC with CGAx2 for safety as pt is unsteady on feet although no overt LOB. Educated provided regarding precautions and HEP for rTSA. Pt will continue to benefit from skilled PT to address functional deficits and increase level of independence with bed mobility, transfers, and gait. PT rec return to facility and receive PT services.     Time Calculation:    PT Charges     Row Name 05/26/22 1138             Time Calculation    Start  Time 1056  -CB      Stop Time 1115  -CB      Time Calculation (min) 19 min  -CB      PT Received On 05/26/22  -CB      PT - Next Appointment 05/27/22  -CB      PT Goal Re-Cert Due Date 06/02/22  -CB              Time Calculation- PT    Total Timed Code Minutes- PT 10 minute(s)  -CB              Timed Charges    15497 - PT Therapeutic Activity Minutes 10  -CB              Total Minutes    Timed Charges Total Minutes 10  -CB       Total Minutes 10  -CB            User Key  (r) = Recorded By, (t) = Taken By, (c) = Cosigned By    Initials Name Provider Type    CB Naina Jo, PT Physical Therapist              Therapy Charges for Today     Code Description Service Date Service Provider Modifiers Qty    95497889712 HC PT THERAPEUTIC ACT EA 15 MIN 5/26/2022 Naina Jo, PT GP 1    20042656137 HC PT EVAL MOD COMPLEXITY 3 5/26/2022 Naina Jo, PT GP 1          PT G-Codes  Outcome Measure Options: AM-PAC 6 Clicks Daily Activity (OT)  AM-PAC 6 Clicks Score (PT): 17    Naina Jo PT  5/26/2022

## 2022-05-27 NOTE — CASE MANAGEMENT/SOCIAL WORK
Case Management Discharge Note      Final Note: Patient discharged to [facility] for rehab.    Provided Post Acute Provider List?: N/A  N/A Provider List Comment: Requests to return to [facility].    Selected Continued Care - Discharged on 5/26/2022 Admission date: 5/25/2022 - Discharge disposition: Skilled Nursing Facility (DC - External)    Destination Coordination complete.    Service Provider Selected Services Address Phone Fax Patient Preferred    [facility]  Skilled Nursing [address] [address] [phone] --          Durable Medical Equipment    No services have been selected for the patient.              Dialysis/Infusion    No services have been selected for the patient.              Home Medical Care    No services have been selected for the patient.              Therapy    No services have been selected for the patient.              Community Resources    No services have been selected for the patient.              Community & DME    No services have been selected for the patient.                       Final Discharge Disposition Code: 03 - skilled nursing facility (SNF)

## 2022-07-11 ENCOUNTER — TRANSCRIBE ORDERS (OUTPATIENT)
Dept: ADMINISTRATIVE | Facility: HOSPITAL | Age: 72
End: 2022-07-11

## 2022-07-11 DIAGNOSIS — Z12.31 VISIT FOR SCREENING MAMMOGRAM: Primary | ICD-10-CM

## 2022-09-21 ENCOUNTER — PATIENT ROUNDING (BHMG ONLY) (OUTPATIENT)
Dept: OBSTETRICS AND GYNECOLOGY | Age: 72
End: 2022-09-21

## 2022-09-21 ENCOUNTER — OFFICE VISIT (OUTPATIENT)
Dept: OBSTETRICS AND GYNECOLOGY | Age: 72
End: 2022-09-21

## 2022-09-21 VITALS
BODY MASS INDEX: 37.77 KG/M2 | SYSTOLIC BLOOD PRESSURE: 142 MMHG | WEIGHT: 255 LBS | HEIGHT: 69 IN | DIASTOLIC BLOOD PRESSURE: 80 MMHG

## 2022-09-21 DIAGNOSIS — Z80.3 FAMILY HISTORY OF BREAST CANCER: ICD-10-CM

## 2022-09-21 DIAGNOSIS — Z01.419 WELL WOMAN EXAM WITH ROUTINE GYNECOLOGICAL EXAM: Primary | ICD-10-CM

## 2022-09-21 DIAGNOSIS — Z12.11 SCREEN FOR COLON CANCER: ICD-10-CM

## 2022-09-21 DIAGNOSIS — Z12.4 SCREENING FOR CERVICAL CANCER: ICD-10-CM

## 2022-09-21 PROCEDURE — 99397 PER PM REEVAL EST PAT 65+ YR: CPT | Performed by: NURSE PRACTITIONER

## 2022-09-21 PROCEDURE — 3015F CERV CANCER SCREEN DOCD: CPT | Performed by: NURSE PRACTITIONER

## 2022-09-21 NOTE — PROGRESS NOTES
Subjective     Chief Complaint   Patient presents with   • Gynecologic Exam     Cc: New gyn - annual visit today, mammogram 9/29/22 @ [facility] , last dexa 3/2018 , colonoscopy        History of Present Illness    Екатерина Jansen is a 72 y.o. No obstetric history on file. who presents for annual exam.    New GYN  Soc - resides in a nursing home, here with someone to help her today  She is postmenopausal - denies vaginal bleeding  Has significant family hx of cancer, would like my risk screening today   Pap hx - no abnormal's that she knows of  Mammo scheduled at [facility] on 9/29  Colonoscopy - was due in 2020, hx of polyps   No other GYN concerns or complaints     Obstetric History:  OB History    No obstetric history on file.        Menstrual History:     No LMP recorded (lmp unknown). Patient is postmenopausal.         Current contraception: abstinence and post menopausal status  History of abnormal Pap smear: no  Received Gardasil immunization: no  Perform regular self breast exam: no  Family history of uterine or ovarian cancer: no  Family History of colon cancer: no  Family history of breast cancer: no    Mammogram: up to date.  Colonoscopy: Ordered.  DEXA: up to date.    Exercise: not active  Calcium/Vitamin D: uses supplements    The following portions of the patient's history were reviewed and updated as appropriate: allergies, current medications, past family history, past medical history, past social history, past surgical history and problem list.    Review of Systems   Constitutional: Negative.    Respiratory: Negative.    Cardiovascular: Negative.    Gastrointestinal: Negative.    Genitourinary: Negative.    Musculoskeletal: Positive for arthralgias.   Skin: Negative.    Psychiatric/Behavioral: Negative.            Objective   Physical Exam  Constitutional:       General: She is awake.      Appearance: Normal appearance. She is well-developed. She is obese.   HENT:      Head: Normocephalic  and atraumatic.      Nose: Nose normal.   Neck:      Thyroid: No thyroid mass, thyromegaly or thyroid tenderness.   Cardiovascular:      Rate and Rhythm: Normal rate and regular rhythm.      Pulses: Normal pulses.      Heart sounds: Normal heart sounds.   Pulmonary:      Effort: Pulmonary effort is normal.      Breath sounds: Normal breath sounds.   Chest:   Breasts: Breasts are symmetrical.      Right: Normal. No swelling, bleeding, inverted nipple, mass, nipple discharge, skin change, tenderness or supraclavicular adenopathy.      Left: Normal. No swelling, bleeding, inverted nipple, mass, nipple discharge, skin change, tenderness or supraclavicular adenopathy.       Abdominal:      General: Abdomen is flat. Bowel sounds are normal.      Palpations: Abdomen is soft.      Tenderness: There is no abdominal tenderness.   Genitourinary:     General: Normal vulva.      Labia:         Right: No rash, tenderness, lesion or injury.         Left: No rash, tenderness, lesion or injury.       Urethra: No prolapse, urethral pain, urethral swelling or urethral lesion.      Vagina: Normal. No signs of injury. No vaginal discharge, erythema, tenderness, bleeding, lesions or prolapsed vaginal walls.      Cervix: No discharge, friability, lesion, erythema or cervical bleeding.      Uterus: Normal. Not enlarged, not tender and no uterine prolapse.       Adnexa: Right adnexa normal and left adnexa normal.        Right: No mass, tenderness or fullness.          Left: No mass, tenderness or fullness.        Rectum: Normal. No mass.      Comments: Exam limited by body habitus  Musculoskeletal:      Cervical back: Normal range of motion and neck supple.   Lymphadenopathy:      Upper Body:      Right upper body: No supraclavicular adenopathy.      Left upper body: No supraclavicular adenopathy.   Skin:     General: Skin is warm and dry.   Neurological:      General: No focal deficit present.      Mental Status: She is alert and oriented  "to person, place, and time.   Psychiatric:         Mood and Affect: Mood normal.         Behavior: Behavior normal. Behavior is cooperative.         Thought Content: Thought content normal.         Judgment: Judgment normal.         /80   Ht 175.3 cm (69\")   Wt 116 kg (255 lb)   LMP  (LMP Unknown)   Breastfeeding No   BMI 37.66 kg/m²     Assessment & Plan   Diagnoses and all orders for this visit:    1. Well woman exam with routine gynecological exam (Primary)    2. Screening for cervical cancer  -     IGP, Apt HPV,rfx 16 / 18,45    3. Screen for colon cancer  -     Ambulatory Referral For Screening Colonoscopy    4. Family history of breast cancer        All questions answered.  Breast self exam technique reviewed and patient encouraged to perform self-exam monthly.  Discussed healthy lifestyle modifications.  Recommended 30 minutes of aerobic exercise five times per week.  Discussed calcium needs to prevent osteoporosis.    -Discussed pap guidelines. Pt wanted pap today, discussed if normal no further pap smears needed.  -Goldy done today  -Referral for colonoscopy               "

## 2022-09-21 NOTE — PROGRESS NOTES
A MY CHART MESSAGE HAS BEEN SENT TO THE PATIENT [place] [facility].

## 2022-09-28 LAB
CYTOLOGIST CVX/VAG CYTO: NORMAL
CYTOLOGY CVX/VAG DOC CYTO: NORMAL
CYTOLOGY CVX/VAG DOC THIN PREP: NORMAL
DX ICD CODE: NORMAL
HIV 1 & 2 AB SER-IMP: NORMAL
HPV I/H RISK 4 DNA CVX QL PROBE+SIG AMP: NEGATIVE
OTHER STN SPEC: NORMAL
STAT OF ADQ CVX/VAG CYTO-IMP: NORMAL

## 2022-09-29 ENCOUNTER — HOSPITAL ENCOUNTER (OUTPATIENT)
Dept: MAMMOGRAPHY | Facility: HOSPITAL | Age: 72
Discharge: HOME | End: 2022-09-29
Admitting: FAMILY MEDICINE

## 2022-09-29 DIAGNOSIS — Z12.31 VISIT FOR SCREENING MAMMOGRAM: ICD-10-CM

## 2022-09-29 PROCEDURE — 77067 SCR MAMMO BI INCL CAD: CPT

## 2022-09-29 PROCEDURE — 77063 BREAST TOMOSYNTHESIS BI: CPT

## (undated) DEVICE — TUBING, SUCTION, 1/4" X 20', STRAIGHT: Brand: MEDLINE INDUSTRIES, INC.

## (undated) DEVICE — VIOLET BRAIDED (POLYGLACTIN 910), SYNTHETIC ABSORBABLE SUTURE: Brand: COATED VICRYL

## (undated) DEVICE — DRAPE,U/ SHT,SPLIT,PLAS,STERIL: Brand: MEDLINE

## (undated) DEVICE — MAT FLR ABSORBENT LG 4FT 10 2.5FT

## (undated) DEVICE — CONTAINER,SPECIMEN,OR STERILE,4OZ: Brand: MEDLINE

## (undated) DEVICE — SUT VIC 0 CT1 36IN J946H

## (undated) DEVICE — GLV SURG SIGNATURE ESSENTIAL PF LTX SZ8

## (undated) DEVICE — 3M™ IOBAN™ 2 ANTIMICROBIAL INCISE DRAPE 6650EZ: Brand: IOBAN™ 2

## (undated) DEVICE — SUT ETHIB 2 CV V37 MS/4 30IN MX69G

## (undated) DEVICE — ADHS SKIN SURG TISS VISC PREMIERPRO EXOFIN HI/VISC FAST/DRY

## (undated) DEVICE — PREP SOL POVIDONE/IODINE BT 4OZ

## (undated) DEVICE — SYRINGE, LUER LOCK, 60ML: Brand: MEDLINE

## (undated) DEVICE — DRSNG SURESITE123 4X10IN

## (undated) DEVICE — HANDPIECE SET WITH COAXIAL HIGH FLOW TIP AND SUCTION TUBE: Brand: INTERPULSE

## (undated) DEVICE — APPL CHLORAPREP HI/LITE 26ML ORNG

## (undated) DEVICE — SOL ISO/ALC RUB 70PCT 4OZ

## (undated) DEVICE — GLV SURG BIOGEL LTX PF 8

## (undated) DEVICE — ANTIBACTERIAL UNDYED BRAIDED (POLYGLACTIN 910), SYNTHETIC ABSORBABLE SUTURE: Brand: COATED VICRYL

## (undated) DEVICE — 1016 S-DRAPE IRRIG POUCH 10/BOX: Brand: STERI-DRAPE™

## (undated) DEVICE — NEEDLE, QUINCKE, 20GX3.5": Brand: MEDLINE

## (undated) DEVICE — IMMOB SHLDR PAD2 UNIV SM

## (undated) DEVICE — TRAP FLD MINIVAC MEGADYNE 100ML

## (undated) DEVICE — DRSNG SLVR/ANTIBAC PRIMASEAL POST/OP ADHS 3.5X10IN

## (undated) DEVICE — DUAL CUT SAGITTAL BLADE

## (undated) DEVICE — SHEET, DRAPE, SPLIT, STERILE: Brand: MEDLINE

## (undated) DEVICE — TBG PENCL TELESCP MEGADYNE SMOKE EVAC 10FT

## (undated) DEVICE — PK SHLDR OPN 40

## (undated) DEVICE — GLV SURG PREMIERPRO ORTHO LTX PF SZ8 BRN